# Patient Record
Sex: FEMALE | Race: BLACK OR AFRICAN AMERICAN | NOT HISPANIC OR LATINO | Employment: FULL TIME | URBAN - METROPOLITAN AREA
[De-identification: names, ages, dates, MRNs, and addresses within clinical notes are randomized per-mention and may not be internally consistent; named-entity substitution may affect disease eponyms.]

---

## 2023-04-03 ENCOUNTER — OFFICE VISIT (OUTPATIENT)
Dept: PRIMARY CARE | Facility: CLINIC | Age: 38
End: 2023-04-03
Payer: COMMERCIAL

## 2023-04-03 ENCOUNTER — LAB (OUTPATIENT)
Dept: LAB | Facility: LAB | Age: 38
End: 2023-04-03
Payer: COMMERCIAL

## 2023-04-03 DIAGNOSIS — Z30.42 ENCOUNTER FOR SURVEILLANCE OF INJECTABLE CONTRACEPTIVE: Primary | ICD-10-CM

## 2023-04-03 DIAGNOSIS — Z00.00 ROUTINE GENERAL MEDICAL EXAMINATION AT A HEALTH CARE FACILITY: ICD-10-CM

## 2023-04-03 DIAGNOSIS — B00.52 HSV (HERPES SIMPLEX VIRUS) DENDRITIC KERATITIS: ICD-10-CM

## 2023-04-03 PROBLEM — K58.9 SYMPTOMS CONSISTENT WITH IRRITABLE BOWEL SYNDROME: Status: ACTIVE | Noted: 2023-04-03

## 2023-04-03 PROBLEM — A60.00 GENITAL HERPES SIMPLEX: Status: ACTIVE | Noted: 2023-04-03

## 2023-04-03 PROBLEM — B00.9 HSV (HERPES SIMPLEX VIRUS) INFECTION: Status: ACTIVE | Noted: 2023-04-03

## 2023-04-03 PROBLEM — L21.9 SEBORRHEIC DERMATITIS: Status: ACTIVE | Noted: 2023-04-03

## 2023-04-03 PROBLEM — R09.82 PND (POST-NASAL DRIP): Status: ACTIVE | Noted: 2023-04-03

## 2023-04-03 PROBLEM — J30.9 ALLERGIC RHINITIS: Status: ACTIVE | Noted: 2023-04-03

## 2023-04-03 PROBLEM — K59.00 CONSTIPATION: Status: ACTIVE | Noted: 2023-04-03

## 2023-04-03 LAB
BASOPHILS (10*3/UL) IN BLOOD BY AUTOMATED COUNT: 0.06 X10E9/L (ref 0–0.1)
BASOPHILS/100 LEUKOCYTES IN BLOOD BY AUTOMATED COUNT: 0.5 % (ref 0–2)
EOSINOPHILS (10*3/UL) IN BLOOD BY AUTOMATED COUNT: 0.13 X10E9/L (ref 0–0.7)
EOSINOPHILS/100 LEUKOCYTES IN BLOOD BY AUTOMATED COUNT: 1.2 % (ref 0–6)
ERYTHROCYTE DISTRIBUTION WIDTH (RATIO) BY AUTOMATED COUNT: 12.6 % (ref 11.5–14.5)
ERYTHROCYTE MEAN CORPUSCULAR HEMOGLOBIN CONCENTRATION (G/DL) BY AUTOMATED: 33.2 G/DL (ref 32–36)
ERYTHROCYTE MEAN CORPUSCULAR VOLUME (FL) BY AUTOMATED COUNT: 96 FL (ref 80–100)
ERYTHROCYTES (10*6/UL) IN BLOOD BY AUTOMATED COUNT: 4.12 X10E12/L (ref 4–5.2)
HEMATOCRIT (%) IN BLOOD BY AUTOMATED COUNT: 39.4 % (ref 36–46)
HEMOGLOBIN (G/DL) IN BLOOD: 13.1 G/DL (ref 12–16)
IMMATURE GRANULOCYTES/100 LEUKOCYTES IN BLOOD BY AUTOMATED COUNT: 0.2 % (ref 0–0.9)
LEUKOCYTES (10*3/UL) IN BLOOD BY AUTOMATED COUNT: 11 X10E9/L (ref 4.4–11.3)
LYMPHOCYTES (10*3/UL) IN BLOOD BY AUTOMATED COUNT: 3.75 X10E9/L (ref 1.2–4.8)
LYMPHOCYTES/100 LEUKOCYTES IN BLOOD BY AUTOMATED COUNT: 34.1 % (ref 13–44)
MONOCYTES (10*3/UL) IN BLOOD BY AUTOMATED COUNT: 0.81 X10E9/L (ref 0.1–1)
MONOCYTES/100 LEUKOCYTES IN BLOOD BY AUTOMATED COUNT: 7.4 % (ref 2–10)
NEUTROPHILS (10*3/UL) IN BLOOD BY AUTOMATED COUNT: 6.23 X10E9/L (ref 1.2–7.7)
NEUTROPHILS/100 LEUKOCYTES IN BLOOD BY AUTOMATED COUNT: 56.6 % (ref 40–80)
NRBC (PER 100 WBCS) BY AUTOMATED COUNT: 0 /100 WBC (ref 0–0)
PLATELETS (10*3/UL) IN BLOOD AUTOMATED COUNT: 281 X10E9/L (ref 150–450)

## 2023-04-03 PROCEDURE — 80053 COMPREHEN METABOLIC PANEL: CPT

## 2023-04-03 PROCEDURE — 85025 COMPLETE CBC W/AUTO DIFF WBC: CPT

## 2023-04-03 PROCEDURE — 80061 LIPID PANEL: CPT

## 2023-04-03 PROCEDURE — 36415 COLL VENOUS BLD VENIPUNCTURE: CPT

## 2023-04-03 PROCEDURE — 99395 PREV VISIT EST AGE 18-39: CPT | Performed by: INTERNAL MEDICINE

## 2023-04-03 PROCEDURE — 87491 CHLMYD TRACH DNA AMP PROBE: CPT

## 2023-04-03 PROCEDURE — 87591 N.GONORRHOEAE DNA AMP PROB: CPT

## 2023-04-03 PROCEDURE — 87389 HIV-1 AG W/HIV-1&-2 AB AG IA: CPT

## 2023-04-03 RX ORDER — VALACYCLOVIR HYDROCHLORIDE 500 MG/1
TABLET, FILM COATED ORAL
Qty: 90 TABLET | Refills: 2 | Status: SHIPPED | OUTPATIENT
Start: 2023-04-03 | End: 2023-09-19 | Stop reason: SDUPTHER

## 2023-04-03 RX ORDER — FLUTICASONE PROPIONATE 50 MCG
1 SPRAY, SUSPENSION (ML) NASAL 2 TIMES DAILY
COMMUNITY
Start: 2019-12-26

## 2023-04-03 RX ORDER — MEDROXYPROGESTERONE ACETATE 150 MG/ML
1 INJECTION, SUSPENSION INTRAMUSCULAR
COMMUNITY
Start: 2021-03-18 | End: 2023-04-03 | Stop reason: SDUPTHER

## 2023-04-03 RX ORDER — MEDROXYPROGESTERONE ACETATE 150 MG/ML
150 INJECTION, SUSPENSION INTRAMUSCULAR
Qty: 1 ML | Refills: 3 | Status: SHIPPED | OUTPATIENT
Start: 2023-04-03 | End: 2024-04-02

## 2023-04-03 RX ORDER — CICLOPIROX 1 G/100ML
SHAMPOO TOPICAL
COMMUNITY
Start: 2021-08-09 | End: 2023-09-19 | Stop reason: SDUPTHER

## 2023-04-03 NOTE — PROGRESS NOTES
Patient received a Depo injection today, she provided her own medication brought in from pharmacy. Medroxyprogesterone 150mg/ml, 1ml was injected into right gluteal muscle . NDC: 70090-388-16, LOT: [id number], EXP: 04/30/2027

## 2023-04-03 NOTE — PATIENT INSTRUCTIONS
Cate,     We are doing full lab and urine testing today to make sure you are healthy.   Your Pap was normal in 2020 - this is not due again until 2025.   If you want to try to prevent outbreaks, take one of the Valtrex (valacyclovir) tabs daily every day as a preventive. If you decided not to take it daily, the way to take it when you had a flare would be  TWO tabs twice a day until the flare was over.     CL

## 2023-04-03 NOTE — PROGRESS NOTES
HPI/Subjective: Patient presents for Depo shot. She denies any new complaints. Menstrual periods have ceased with this method of contraception. Denies any new cardiac, pulmonary, GI or  complaints.    A 12-point ROS completed and negative except for that listed in HPI    Physical Exam   Constitutional: appears stated age, NAD  HEENT: normocephalic, atraumatic  Cardiovascular: normal S1 & S2, no murmurs rubs or gallops  Pulmonary: clear to auscultation bilaterally, no wheezes or rales  Abdominal: BS+, non tender, non distended  Extremities: no LE edema      A/P: Ms. Valencia is a 37 year old female who presents for Depo shot for contraception. She has no other active complaints today     #Contraception   -Depo for suppression of menses given SHV associated with menses   -no issues or changes to be made     #Genital HSV   -monthly outbreaks   -Valtrex prophy offered and elected    #HM   -Pap 6/2020 WNL - 5 years   -basic labs today        Faby Anderson MD

## 2023-04-04 LAB
ALANINE AMINOTRANSFERASE (SGPT) (U/L) IN SER/PLAS: 6 U/L (ref 7–45)
ALBUMIN (G/DL) IN SER/PLAS: 4.4 G/DL (ref 3.4–5)
ALKALINE PHOSPHATASE (U/L) IN SER/PLAS: 77 U/L (ref 33–110)
ANION GAP IN SER/PLAS: 15 MMOL/L (ref 10–20)
ASPARTATE AMINOTRANSFERASE (SGOT) (U/L) IN SER/PLAS: 14 U/L (ref 9–39)
BILIRUBIN TOTAL (MG/DL) IN SER/PLAS: 0.4 MG/DL (ref 0–1.2)
CALCIUM (MG/DL) IN SER/PLAS: 9.7 MG/DL (ref 8.6–10.6)
CARBON DIOXIDE, TOTAL (MMOL/L) IN SER/PLAS: 23 MMOL/L (ref 21–32)
CHLAMYDIA TRACH., AMPLIFIED: NEGATIVE
CHLORIDE (MMOL/L) IN SER/PLAS: 105 MMOL/L (ref 98–107)
CHOLESTEROL (MG/DL) IN SER/PLAS: 180 MG/DL (ref 0–199)
CHOLESTEROL IN HDL (MG/DL) IN SER/PLAS: 73.3 MG/DL
CHOLESTEROL/HDL RATIO: 2.5
CREATININE (MG/DL) IN SER/PLAS: 0.63 MG/DL (ref 0.5–1.05)
GFR FEMALE: >90 ML/MIN/1.73M2
GLUCOSE (MG/DL) IN SER/PLAS: 76 MG/DL (ref 74–99)
HIV 1/ 2 AG/AB SCREEN: NONREACTIVE
LDL: 93 MG/DL (ref 0–99)
N. GONORRHEA, AMPLIFIED: NEGATIVE
POTASSIUM (MMOL/L) IN SER/PLAS: 4.2 MMOL/L (ref 3.5–5.3)
PROTEIN TOTAL: 7.4 G/DL (ref 6.4–8.2)
SODIUM (MMOL/L) IN SER/PLAS: 139 MMOL/L (ref 136–145)
TRIGLYCERIDE (MG/DL) IN SER/PLAS: 68 MG/DL (ref 0–149)
UREA NITROGEN (MG/DL) IN SER/PLAS: 10 MG/DL (ref 6–23)
VLDL: 14 MG/DL (ref 0–40)

## 2023-07-03 ENCOUNTER — CLINICAL SUPPORT (OUTPATIENT)
Dept: PRIMARY CARE | Facility: CLINIC | Age: 38
End: 2023-07-03
Payer: COMMERCIAL

## 2023-07-03 DIAGNOSIS — Z30.42 ENCOUNTER FOR SURVEILLANCE OF INJECTABLE CONTRACEPTIVE: ICD-10-CM

## 2023-07-03 NOTE — PROGRESS NOTES
Patient was seen today for depo injection, she brought in her medication from her pharmacy to be administered.   Medroxyprogesterone 150mg/ml  - 1ml was injected into right gluteal muscle. LOT: [id number], NDC: 70576-771-29, EXP: 04/30/2027

## 2023-09-19 ENCOUNTER — OFFICE VISIT (OUTPATIENT)
Dept: PRIMARY CARE | Facility: CLINIC | Age: 38
End: 2023-09-19
Payer: COMMERCIAL

## 2023-09-19 DIAGNOSIS — B00.52 HSV (HERPES SIMPLEX VIRUS) DENDRITIC KERATITIS: ICD-10-CM

## 2023-09-19 DIAGNOSIS — Z23 ENCOUNTER FOR VACCINATION: Primary | ICD-10-CM

## 2023-09-19 DIAGNOSIS — Z30.8 ENCOUNTER FOR OTHER CONTRACEPTIVE MANAGEMENT: ICD-10-CM

## 2023-09-19 DIAGNOSIS — L21.9 SEBORRHEIC DERMATITIS: ICD-10-CM

## 2023-09-19 PROCEDURE — 90471 IMMUNIZATION ADMIN: CPT | Performed by: INTERNAL MEDICINE

## 2023-09-19 PROCEDURE — 99213 OFFICE O/P EST LOW 20 MIN: CPT | Performed by: INTERNAL MEDICINE

## 2023-09-19 PROCEDURE — 96372 THER/PROPH/DIAG INJ SC/IM: CPT | Performed by: INTERNAL MEDICINE

## 2023-09-19 PROCEDURE — 90686 IIV4 VACC NO PRSV 0.5 ML IM: CPT | Performed by: INTERNAL MEDICINE

## 2023-09-19 RX ORDER — MEDROXYPROGESTERONE ACETATE 150 MG/ML
150 INJECTION, SUSPENSION INTRAMUSCULAR ONCE
Status: COMPLETED | OUTPATIENT
Start: 2023-09-19 | End: 2023-09-19

## 2023-09-19 RX ORDER — CICLOPIROX 1 G/100ML
SHAMPOO TOPICAL
Qty: 120 ML | Refills: 11 | Status: SHIPPED | OUTPATIENT
Start: 2023-09-19 | End: 2024-03-21 | Stop reason: SDUPTHER

## 2023-09-19 RX ORDER — VALACYCLOVIR HYDROCHLORIDE 500 MG/1
TABLET, FILM COATED ORAL
Qty: 90 TABLET | Refills: 2 | Status: SHIPPED | OUTPATIENT
Start: 2023-09-19

## 2023-09-19 RX ADMIN — MEDROXYPROGESTERONE ACETATE 150 MG: 150 INJECTION, SUSPENSION INTRAMUSCULAR at 15:56

## 2023-09-19 NOTE — PROGRESS NOTES
"Cate Krause is a 37 yo F presenitng in .     1. Contraception - Depo, which is going well however int in OCP instead   -start OCP in 3 months   -call back in 12.2023 - will call for OCP rx closer to     2. Genital HSV - new start daily Valtrex 4.23 but did not start.     3. Seborrhie cdermatitis - ciclopirox shampoo PRN     #HM   -pap 6.2020 - due 6.2025   -flu shot today   -discuss tdap next visit   -screen labs incl sti 4.23      63\"   163 in 5.22           Gen alert well appearing NAD   HENT mmm pharynx clear   CV rrr nl s/12 no m/r/g        "

## 2023-09-19 NOTE — PROGRESS NOTES
Patient Received Medroxyprogesterone 150 mg/ml today, She provided her own medication she brought from pharmacy. 1 ml was injected into right gluteal muscle.

## 2023-12-18 ENCOUNTER — CLINICAL SUPPORT (OUTPATIENT)
Dept: PRIMARY CARE | Facility: CLINIC | Age: 38
End: 2023-12-18
Payer: COMMERCIAL

## 2023-12-18 DIAGNOSIS — Z30.42 ENCOUNTER FOR SURVEILLANCE OF INJECTABLE CONTRACEPTIVE: ICD-10-CM

## 2023-12-18 PROCEDURE — 96372 THER/PROPH/DIAG INJ SC/IM: CPT | Performed by: INTERNAL MEDICINE

## 2023-12-18 RX ORDER — MEDROXYPROGESTERONE ACETATE 150 MG/ML
150 INJECTION, SUSPENSION INTRAMUSCULAR ONCE
Status: COMPLETED | OUTPATIENT
Start: 2023-12-18 | End: 2023-12-18

## 2023-12-18 RX ADMIN — MEDROXYPROGESTERONE ACETATE 150 MG: 150 INJECTION, SUSPENSION INTRAMUSCULAR at 15:17

## 2024-03-04 ENCOUNTER — TELEPHONE (OUTPATIENT)
Dept: PRIMARY CARE | Facility: CLINIC | Age: 39
End: 2024-03-04
Payer: COMMERCIAL

## 2024-03-04 NOTE — TELEPHONE ENCOUNTER
Patient is on depo, she discussed switching to Slynd birth control instead with Dr Schneider, she is due for next depo coming up and she just want to start Slynd at that time, can you send in a rx for Slynd   to rena listed in chart

## 2024-03-13 ENCOUNTER — TELEPHONE (OUTPATIENT)
Dept: PRIMARY CARE | Facility: CLINIC | Age: 39
End: 2024-03-13
Payer: COMMERCIAL

## 2024-03-13 NOTE — TELEPHONE ENCOUNTER
Patient said she spoke with you about changing her contraceptive  , she do not want the depo any more, she is requesting  slynd  be sent to her local pharmacy

## 2024-03-14 DIAGNOSIS — Z30.09 ENCOUNTER FOR COUNSELING REGARDING CONTRACEPTION: Primary | ICD-10-CM

## 2024-03-14 RX ORDER — DROSPIRENONE AND ETHINYL ESTRADIOL 0.03MG-3MG
1 KIT ORAL DAILY
Qty: 28 TABLET | Refills: 12 | Status: SHIPPED | OUTPATIENT
Start: 2024-03-14 | End: 2025-03-14

## 2024-03-15 ENCOUNTER — CLINICAL SUPPORT (OUTPATIENT)
Dept: OBSTETRICS AND GYNECOLOGY | Facility: CLINIC | Age: 39
End: 2024-03-15
Payer: COMMERCIAL

## 2024-03-15 DIAGNOSIS — A64 STD (FEMALE): ICD-10-CM

## 2024-03-15 PROCEDURE — 87205 SMEAR GRAM STAIN: CPT

## 2024-03-15 PROCEDURE — 87661 TRICHOMONAS VAGINALIS AMPLIF: CPT

## 2024-03-15 PROCEDURE — 87591 N.GONORRHOEAE DNA AMP PROB: CPT

## 2024-03-15 PROCEDURE — 87491 CHLMYD TRACH DNA AMP PROBE: CPT

## 2024-03-15 NOTE — PROGRESS NOTES
Name/ verified  Pt arrives for STI testing  C/O vaginal green discharge intermittently, no odor. She states that she noticed it about a month ago. She states that her partner told her yesterday that he recently tested positive for Trich. Pt visibly upset. Emotional support provided.  Vag swab and all STI testing requested by patient.  Will inform pt with results.

## 2024-03-16 LAB
C TRACH RRNA SPEC QL NAA+PROBE: NEGATIVE
CLUE CELLS VAG LPF-#/AREA: NORMAL /[LPF]
N GONORRHOEA DNA SPEC QL PROBE+SIG AMP: NEGATIVE
NUGENT SCORE: 1
T VAGINALIS RRNA SPEC QL NAA+PROBE: POSITIVE
YEAST VAG WET PREP-#/AREA: NORMAL

## 2024-03-18 ENCOUNTER — TELEPHONE (OUTPATIENT)
Dept: OBSTETRICS AND GYNECOLOGY | Facility: CLINIC | Age: 39
End: 2024-03-18
Payer: COMMERCIAL

## 2024-03-18 ENCOUNTER — TELEPHONE (OUTPATIENT)
Dept: PRIMARY CARE | Facility: CLINIC | Age: 39
End: 2024-03-18
Payer: COMMERCIAL

## 2024-03-18 DIAGNOSIS — B37.9 YEAST INFECTION: ICD-10-CM

## 2024-03-18 DIAGNOSIS — A64 STD (FEMALE): ICD-10-CM

## 2024-03-18 RX ORDER — FLUCONAZOLE 150 MG/1
150 TABLET ORAL ONCE
Qty: 1 TABLET | Refills: 0 | Status: SHIPPED | OUTPATIENT
Start: 2024-03-18 | End: 2024-03-18

## 2024-03-18 NOTE — TELEPHONE ENCOUNTER
Called and notified Pt that she has been diagnosed with a sexually transmitted infection.    An antibiotic has been ordered for pt.   EPT can be offered for up to two partners within the past 60 days. Pt declined EPT.  Advised to take antibiotics as soon as possible and encourage partner to do the same.   Please refrain from sex for at least 7 days after both her and partner are treated.   Please use condoms to prevent STIs.    Pt request Diflucan d/t HX of yeast infections when taking antibiotic. Request sent to Dr. Betancur.  Advised to use Monistat 3 night cream OTC if symptoms occur.  Pt verbalized understanding.

## 2024-03-18 NOTE — TELEPHONE ENCOUNTER
Patient said she need refills on her shampoo, also she had a hydrocortisone oil for her scalp that the dermatologist prescribed , she want to know if that can be refilled  and she is taking a antibiotic and need a rx for yeast , please send al 3 to [facility] if posiible

## 2024-03-19 ENCOUNTER — TELEPHONE (OUTPATIENT)
Dept: OBSTETRICS AND GYNECOLOGY | Facility: CLINIC | Age: 39
End: 2024-03-19
Payer: COMMERCIAL

## 2024-03-19 RX ORDER — METRONIDAZOLE 500 MG/1
500 TABLET ORAL 2 TIMES DAILY
Qty: 14 TABLET | Refills: 0 | Status: SHIPPED | OUTPATIENT
Start: 2024-03-19 | End: 2024-03-26

## 2024-03-19 NOTE — TELEPHONE ENCOUNTER
Returned pt phone call notifying that the order for Metronidazole was sent to Dr. Betancur to send to pharmacy.

## 2024-03-21 DIAGNOSIS — B37.31 VULVOVAGINAL CANDIDIASIS: Primary | ICD-10-CM

## 2024-03-21 DIAGNOSIS — L21.9 SEBORRHEIC DERMATITIS: ICD-10-CM

## 2024-03-21 RX ORDER — FLUCONAZOLE 150 MG/1
TABLET ORAL
Qty: 1 TABLET | Refills: 0 | Status: SHIPPED | OUTPATIENT
Start: 2024-03-21

## 2024-03-21 RX ORDER — FLUOCINOLONE ACETONIDE 0.11 MG/ML
OIL TOPICAL 3 TIMES DAILY
Qty: 118.28 ML | Refills: 1 | Status: SHIPPED | OUTPATIENT
Start: 2024-03-21 | End: 2025-03-21

## 2024-03-21 RX ORDER — CICLOPIROX 1 G/100ML
SHAMPOO TOPICAL
Qty: 120 ML | Refills: 11 | Status: SHIPPED | OUTPATIENT
Start: 2024-03-21

## 2024-04-01 ENCOUNTER — LAB (OUTPATIENT)
Dept: LAB | Facility: LAB | Age: 39
End: 2024-04-01
Payer: COMMERCIAL

## 2024-04-01 ENCOUNTER — OFFICE VISIT (OUTPATIENT)
Dept: PRIMARY CARE | Facility: CLINIC | Age: 39
End: 2024-04-01
Payer: COMMERCIAL

## 2024-04-01 VITALS
DIASTOLIC BLOOD PRESSURE: 81 MMHG | HEART RATE: 102 BPM | BODY MASS INDEX: 26.93 KG/M2 | SYSTOLIC BLOOD PRESSURE: 123 MMHG | WEIGHT: 152 LBS | HEIGHT: 63 IN | OXYGEN SATURATION: 98 %

## 2024-04-01 DIAGNOSIS — R30.0 DYSURIA: ICD-10-CM

## 2024-04-01 DIAGNOSIS — A64 STD (FEMALE): ICD-10-CM

## 2024-04-01 DIAGNOSIS — R30.0 DYSURIA: Primary | ICD-10-CM

## 2024-04-01 PROCEDURE — 87661 TRICHOMONAS VAGINALIS AMPLIF: CPT

## 2024-04-01 PROCEDURE — 86695 HERPES SIMPLEX TYPE 1 TEST: CPT

## 2024-04-01 PROCEDURE — 86696 HERPES SIMPLEX TYPE 2 TEST: CPT

## 2024-04-01 PROCEDURE — 1036F TOBACCO NON-USER: CPT | Performed by: INTERNAL MEDICINE

## 2024-04-01 PROCEDURE — 87340 HEPATITIS B SURFACE AG IA: CPT

## 2024-04-01 PROCEDURE — 36415 COLL VENOUS BLD VENIPUNCTURE: CPT

## 2024-04-01 PROCEDURE — 99213 OFFICE O/P EST LOW 20 MIN: CPT | Performed by: INTERNAL MEDICINE

## 2024-04-01 PROCEDURE — 87389 HIV-1 AG W/HIV-1&-2 AB AG IA: CPT

## 2024-04-01 NOTE — PROGRESS NOTES
Cate Krause is a 39 yo F presenting for acute visit.     *Tested positive for trichomonas on 3.15.24   -treated and partner treated as well   -when partner ejaculated on her she noted whiter colored discharge, wonders if was fully treated       *Just switched from Depo to first pill pack of OCPs middle of March.   -plans to continue to give it a try       Chronic PL     1. Contraception   -Depo in the past, OCPs more recently       2. Genital HSV   -Valtrex daily     3. Seborrheic dermatitis   -ciclopirox topical shampoo PRN     #HM   -pap 6.2020 - 5 yrs   -screen labs 4.2023         ROS   Gen neg fever neg chills neg malaise       O   Gen alert well appearing NAD       A/P   Cate presents after positive trichomonas 3.15.24 s/p 7 days Flagyl with some GI intolerance.   -re-screen urine ALBERT

## 2024-04-02 LAB
HBV SURFACE AG SERPL QL IA: NONREACTIVE
HERPES SIMPLEX VIRUS 1 IGG: 0.3 INDEX
HERPES SIMPLEX VIRUS 2 IGG: >8 INDEX
HIV 1+2 AB+HIV1 P24 AG SERPL QL IA: NONREACTIVE
T VAGINALIS RRNA SPEC QL NAA+PROBE: NEGATIVE

## 2024-04-03 ENCOUNTER — TELEPHONE (OUTPATIENT)
Dept: OBSTETRICS AND GYNECOLOGY | Facility: CLINIC | Age: 39
End: 2024-04-03
Payer: COMMERCIAL

## 2024-04-12 ENCOUNTER — APPOINTMENT (OUTPATIENT)
Dept: PRIMARY CARE | Facility: CLINIC | Age: 39
End: 2024-04-12
Payer: COMMERCIAL

## 2024-04-24 ENCOUNTER — APPOINTMENT (OUTPATIENT)
Dept: PRIMARY CARE | Facility: CLINIC | Age: 39
End: 2024-04-24
Payer: COMMERCIAL

## 2024-05-22 ENCOUNTER — HOSPITAL ENCOUNTER (OUTPATIENT)
Dept: RADIOLOGY | Facility: EXTERNAL LOCATION | Age: 39
Discharge: HOME | End: 2024-05-22
Payer: COMMERCIAL

## 2024-05-22 DIAGNOSIS — J20.9 ACUTE BRONCHITIS, UNSPECIFIED ORGANISM: ICD-10-CM

## 2024-07-09 ENCOUNTER — LAB (OUTPATIENT)
Dept: LAB | Facility: LAB | Age: 39
End: 2024-07-09
Payer: COMMERCIAL

## 2024-07-09 ENCOUNTER — APPOINTMENT (OUTPATIENT)
Dept: PRIMARY CARE | Facility: CLINIC | Age: 39
End: 2024-07-09
Payer: COMMERCIAL

## 2024-07-09 VITALS
OXYGEN SATURATION: 98 % | DIASTOLIC BLOOD PRESSURE: 81 MMHG | HEART RATE: 75 BPM | BODY MASS INDEX: 25.44 KG/M2 | RESPIRATION RATE: 16 BRPM | TEMPERATURE: 98 F | WEIGHT: 149 LBS | SYSTOLIC BLOOD PRESSURE: 116 MMHG | HEIGHT: 64 IN

## 2024-07-09 DIAGNOSIS — B00.52 HSV (HERPES SIMPLEX VIRUS) DENDRITIC KERATITIS: ICD-10-CM

## 2024-07-09 DIAGNOSIS — Z11.3 SCREEN FOR STD (SEXUALLY TRANSMITTED DISEASE): Primary | ICD-10-CM

## 2024-07-09 DIAGNOSIS — Z11.3 SCREEN FOR STD (SEXUALLY TRANSMITTED DISEASE): ICD-10-CM

## 2024-07-09 DIAGNOSIS — Z00.00 HEALTH CARE MAINTENANCE: ICD-10-CM

## 2024-07-09 DIAGNOSIS — Z30.09 ENCOUNTER FOR COUNSELING REGARDING CONTRACEPTION: ICD-10-CM

## 2024-07-09 DIAGNOSIS — L21.9 SEBORRHEIC DERMATITIS: ICD-10-CM

## 2024-07-09 LAB
ALBUMIN SERPL BCP-MCNC: 4.2 G/DL (ref 3.4–5)
ALP SERPL-CCNC: 60 U/L (ref 33–110)
ALT SERPL W P-5'-P-CCNC: 10 U/L (ref 7–45)
ANION GAP SERPL CALC-SCNC: 14 MMOL/L (ref 10–20)
AST SERPL W P-5'-P-CCNC: 13 U/L (ref 9–39)
BILIRUB SERPL-MCNC: 0.3 MG/DL (ref 0–1.2)
BUN SERPL-MCNC: 10 MG/DL (ref 6–23)
CALCIUM SERPL-MCNC: 9.5 MG/DL (ref 8.6–10.6)
CHLORIDE SERPL-SCNC: 103 MMOL/L (ref 98–107)
CHOLEST SERPL-MCNC: 231 MG/DL (ref 0–199)
CHOLESTEROL/HDL RATIO: 2.1
CO2 SERPL-SCNC: 24 MMOL/L (ref 21–32)
CREAT SERPL-MCNC: 0.66 MG/DL (ref 0.5–1.05)
EGFRCR SERPLBLD CKD-EPI 2021: >90 ML/MIN/1.73M*2
ERYTHROCYTE [DISTWIDTH] IN BLOOD BY AUTOMATED COUNT: 12.1 % (ref 11.5–14.5)
EST. AVERAGE GLUCOSE BLD GHB EST-MCNC: 100 MG/DL
GLUCOSE SERPL-MCNC: 84 MG/DL (ref 74–99)
HBA1C MFR BLD: 5.1 %
HBV SURFACE AB SER-ACNC: <3.1 MIU/ML
HBV SURFACE AG SERPL QL IA: NONREACTIVE
HCT VFR BLD AUTO: 38.9 % (ref 36–46)
HCV AB SER QL: NONREACTIVE
HDLC SERPL-MCNC: 111 MG/DL
HGB BLD-MCNC: 12.5 G/DL (ref 12–16)
HIV 1+2 AB+HIV1 P24 AG SERPL QL IA: NONREACTIVE
LDLC SERPL CALC-MCNC: 96 MG/DL
MCH RBC QN AUTO: 32.1 PG (ref 26–34)
MCHC RBC AUTO-ENTMCNC: 32.1 G/DL (ref 32–36)
MCV RBC AUTO: 100 FL (ref 80–100)
NON HDL CHOLESTEROL: 120 MG/DL (ref 0–149)
NRBC BLD-RTO: 0 /100 WBCS (ref 0–0)
PLATELET # BLD AUTO: 289 X10*3/UL (ref 150–450)
POTASSIUM SERPL-SCNC: 4.1 MMOL/L (ref 3.5–5.3)
PROT SERPL-MCNC: 7.2 G/DL (ref 6.4–8.2)
RBC # BLD AUTO: 3.89 X10*6/UL (ref 4–5.2)
SODIUM SERPL-SCNC: 137 MMOL/L (ref 136–145)
TRIGL SERPL-MCNC: 119 MG/DL (ref 0–149)
TSH SERPL-ACNC: 3.44 MIU/L (ref 0.44–3.98)
VLDL: 24 MG/DL (ref 0–40)
WBC # BLD AUTO: 9.9 X10*3/UL (ref 4.4–11.3)

## 2024-07-09 PROCEDURE — 84443 ASSAY THYROID STIM HORMONE: CPT

## 2024-07-09 PROCEDURE — 3008F BODY MASS INDEX DOCD: CPT | Performed by: STUDENT IN AN ORGANIZED HEALTH CARE EDUCATION/TRAINING PROGRAM

## 2024-07-09 PROCEDURE — 86706 HEP B SURFACE ANTIBODY: CPT

## 2024-07-09 PROCEDURE — 85027 COMPLETE CBC AUTOMATED: CPT

## 2024-07-09 PROCEDURE — 87591 N.GONORRHOEAE DNA AMP PROB: CPT

## 2024-07-09 PROCEDURE — 87389 HIV-1 AG W/HIV-1&-2 AB AG IA: CPT

## 2024-07-09 PROCEDURE — 36415 COLL VENOUS BLD VENIPUNCTURE: CPT

## 2024-07-09 PROCEDURE — 99213 OFFICE O/P EST LOW 20 MIN: CPT | Performed by: STUDENT IN AN ORGANIZED HEALTH CARE EDUCATION/TRAINING PROGRAM

## 2024-07-09 PROCEDURE — 87491 CHLMYD TRACH DNA AMP PROBE: CPT

## 2024-07-09 PROCEDURE — 83036 HEMOGLOBIN GLYCOSYLATED A1C: CPT

## 2024-07-09 PROCEDURE — 87340 HEPATITIS B SURFACE AG IA: CPT

## 2024-07-09 PROCEDURE — 87661 TRICHOMONAS VAGINALIS AMPLIF: CPT

## 2024-07-09 PROCEDURE — 80053 COMPREHEN METABOLIC PANEL: CPT

## 2024-07-09 PROCEDURE — 80061 LIPID PANEL: CPT

## 2024-07-09 PROCEDURE — 86803 HEPATITIS C AB TEST: CPT

## 2024-07-09 RX ORDER — DROSPIRENONE AND ETHINYL ESTRADIOL 0.03MG-3MG
KIT ORAL
Qty: 28 TABLET | Refills: 12 | Status: SHIPPED | OUTPATIENT
Start: 2024-07-09

## 2024-07-09 RX ORDER — CICLOPIROX 1 G/100ML
SHAMPOO TOPICAL
Qty: 120 ML | Refills: 11 | Status: SHIPPED | OUTPATIENT
Start: 2024-07-09

## 2024-07-09 RX ORDER — VALACYCLOVIR HYDROCHLORIDE 500 MG/1
TABLET, FILM COATED ORAL
Qty: 90 TABLET | Refills: 5 | Status: SHIPPED | OUTPATIENT
Start: 2024-07-09

## 2024-07-09 NOTE — PROGRESS NOTES
"Subjective   Patient ID: Cate Krause is a 39 y.o. female who presents for Establish Care.  HPI  Ms. Krause is 39 years old here for establishing care  Former patient of Dr. Schneider    Medical problems include  1.  Genital HSV-on Valtrex daily  2.  On birth control pills  No past medical history on file.   Past Surgical History:   Procedure Laterality Date    CERVICAL BIOPSY  W/ LOOP ELECTRODE EXCISION  05/29/2014    Cervical Loop Electrosurgical Excision (LEEP)      Family History   Problem Relation Name Age of Onset    Diabetes Other grandmother       No Known Allergies       Occupation:     Review of Systems   Constitutional:  Negative for activity change and fever.   HENT:  Negative for congestion.    Respiratory:  Negative for cough, shortness of breath and wheezing.    Cardiovascular:  Negative for chest pain and leg swelling.   Gastrointestinal:  Negative for abdominal pain, constipation, nausea and vomiting.   Endocrine: Negative for cold intolerance.   Genitourinary:  Negative for dysuria, hematuria and urgency.   Neurological:  Negative for dizziness, speech difficulty, weakness and numbness.   Psychiatric/Behavioral:  Negative for self-injury and suicidal ideas.        Objective   Visit Vitals  /81   Pulse 75   Temp 36.7 °C (98 °F)   Resp 16   Ht 1.626 m (5' 4\")   Wt 67.6 kg (149 lb)   SpO2 98%   BMI 25.58 kg/m²   Smoking Status Never   BSA 1.75 m²      Physical Exam  Constitutional:       Appearance: Normal appearance.   HENT:      Head: Normocephalic and atraumatic.      Nose: Nose normal.      Mouth/Throat:      Mouth: Mucous membranes are moist.   Eyes:      Conjunctiva/sclera: Conjunctivae normal.      Pupils: Pupils are equal, round, and reactive to light.   Cardiovascular:      Rate and Rhythm: Normal rate and regular rhythm.      Pulses: Normal pulses.      Heart sounds: Normal heart sounds.   Pulmonary:      Effort: Pulmonary effort is normal.      Breath sounds: Normal breath sounds. "   Musculoskeletal:         General: Normal range of motion.      Cervical back: Neck supple.   Skin:     General: Skin is warm.   Neurological:      General: No focal deficit present.      Mental Status: She is alert and oriented to person, place, and time.   Psychiatric:         Mood and Affect: Mood normal.         Behavior: Behavior normal.         Thought Content: Thought content normal.         Judgment: Judgment normal.         Assessment/Plan   Diagnoses and all orders for this visit:  Screen for STD (sexually transmitted disease)  -     Hemoglobin A1C; Future  -     Lipid Panel; Future  -     Comprehensive Metabolic Panel; Future  -     CBC; Future  -     TSH with reflex to Free T4 if abnormal; Future  -     C. Trachomatis / N. Gonorrhoeae, Amplified Detection; Future  -     Trichomonas vaginalis, Nucleic Acid Detection; Future  -     HIV 1/2 Antigen/Antibody Screen with Reflex to Confirmation; Future  -     Hepatitis B Surface Antibody; Future  -     Hepatitis B Surface Antigen; Future  -     Hepatitis C Antibody; Future  Seborrheic dermatitis  -     ciclopirox 1 % shampoo; MASSAGE ONTO WET SCALP AND LEAVE LATHER ON FOR 3 MINUTES, THEN RINSE. USE TWICE WEEKLY FOR 4 WEEKS.  Encounter for counseling regarding contraception  -     drospirenone-ethinyl estradioL (Vanita, 28,) 3-0.03 mg tablet; 1 tablet once daily in the order presented in the blister pack, omitting placebo tablets, and continuing with a new pack of active tablets  HSV (herpes simplex virus) dendritic keratitis  -     valACYclovir (Valtrex) 500 mg tablet; Take 1 tab PO daily  Health care maintenance  -     Hemoglobin A1C; Future  -     Lipid Panel; Future  -     Comprehensive Metabolic Panel; Future  -     CBC; Future  -     TSH with reflex to Free T4 if abnormal; Future  -     C. Trachomatis / N. Gonorrhoeae, Amplified Detection; Future  -     Trichomonas vaginalis, Nucleic Acid Detection; Future  -     HIV 1/2 Antigen/Antibody Screen with  Reflex to Confirmation; Future  -     Hepatitis B Surface Antibody; Future  -     Hepatitis B Surface Antigen; Future  -     Hepatitis C Antibody; Future

## 2024-07-10 LAB
C TRACH RRNA SPEC QL NAA+PROBE: NEGATIVE
N GONORRHOEA DNA SPEC QL PROBE+SIG AMP: NEGATIVE
T VAGINALIS RRNA SPEC QL NAA+PROBE: NEGATIVE

## 2024-07-31 ASSESSMENT — ENCOUNTER SYMPTOMS
ABDOMINAL PAIN: 0
ACTIVITY CHANGE: 0
SHORTNESS OF BREATH: 0
NAUSEA: 0
DIZZINESS: 0
WEAKNESS: 0
FEVER: 0
COUGH: 0
CONSTIPATION: 0
HEMATURIA: 0
VOMITING: 0
NUMBNESS: 0
SPEECH DIFFICULTY: 0
WHEEZING: 0
DYSURIA: 0

## 2024-09-26 ENCOUNTER — TELEPHONE (OUTPATIENT)
Dept: PRIMARY CARE | Facility: CLINIC | Age: 39
End: 2024-09-26

## 2024-09-26 NOTE — TELEPHONE ENCOUNTER
----- Message from Abena Maynard sent at 9/11/2024  5:50 PM EDT -----  please call the patient and let him/her know her labs  1) Elevated cholesterol: to follow a low carb diet, avoid fatty foods, Exercise at least for 150 min/week. No medication needed at this time but will continue to monitor and repeat levels in 3-6 months      Patient can call the office with any questions  to see me in 3months for a follow up and repeat labs

## 2024-10-28 ENCOUNTER — APPOINTMENT (OUTPATIENT)
Dept: PRIMARY CARE | Facility: CLINIC | Age: 39
End: 2024-10-28
Payer: COMMERCIAL

## 2025-01-29 DIAGNOSIS — L21.9 SEBORRHEIC DERMATITIS: ICD-10-CM

## 2025-01-30 ENCOUNTER — APPOINTMENT (OUTPATIENT)
Dept: PRIMARY CARE | Facility: CLINIC | Age: 40
End: 2025-01-30
Payer: COMMERCIAL

## 2025-01-30 RX ORDER — CICLOPIROX 1 G/100ML
SHAMPOO TOPICAL
Qty: 120 ML | Refills: 11 | Status: SHIPPED | OUTPATIENT
Start: 2025-01-30

## 2025-02-25 ENCOUNTER — TELEPHONE (OUTPATIENT)
Dept: PRIMARY CARE | Facility: CLINIC | Age: 40
End: 2025-02-25
Payer: COMMERCIAL

## 2025-02-25 DIAGNOSIS — Z30.09 ENCOUNTER FOR COUNSELING REGARDING CONTRACEPTION: ICD-10-CM

## 2025-02-26 RX ORDER — DROSPIRENONE AND ETHINYL ESTRADIOL 0.03MG-3MG
KIT ORAL
Qty: 28 TABLET | Refills: 12 | Status: SHIPPED | OUTPATIENT
Start: 2025-02-26

## 2025-05-06 ENCOUNTER — TELEPHONE (OUTPATIENT)
Dept: OBSTETRICS AND GYNECOLOGY | Facility: CLINIC | Age: 40
End: 2025-05-06
Payer: COMMERCIAL

## 2025-05-06 NOTE — TELEPHONE ENCOUNTER
Patient has lump under left breast. Annual is scheduled 6/17, currently next available is 6/2 with Dr. Hill. Is it ok to wait that long or is there something she should do sooner?

## 2025-05-07 ENCOUNTER — OFFICE VISIT (OUTPATIENT)
Dept: OBSTETRICS AND GYNECOLOGY | Facility: CLINIC | Age: 40
End: 2025-05-07
Payer: COMMERCIAL

## 2025-05-07 VITALS
WEIGHT: 145 LBS | BODY MASS INDEX: 25.69 KG/M2 | DIASTOLIC BLOOD PRESSURE: 70 MMHG | HEIGHT: 63 IN | SYSTOLIC BLOOD PRESSURE: 120 MMHG

## 2025-05-07 DIAGNOSIS — N63.0 LUMP IN FEMALE BREAST: ICD-10-CM

## 2025-05-07 PROCEDURE — 3008F BODY MASS INDEX DOCD: CPT | Performed by: OBSTETRICS & GYNECOLOGY

## 2025-05-07 PROCEDURE — 1036F TOBACCO NON-USER: CPT | Performed by: OBSTETRICS & GYNECOLOGY

## 2025-05-07 PROCEDURE — 99214 OFFICE O/P EST MOD 30 MIN: CPT | Performed by: OBSTETRICS & GYNECOLOGY

## 2025-05-07 ASSESSMENT — ENCOUNTER SYMPTOMS
RESPIRATORY NEGATIVE: 0
NEUROLOGICAL NEGATIVE: 0
HEMATOLOGIC/LYMPHATIC NEGATIVE: 0
GASTROINTESTINAL NEGATIVE: 0
CONSTITUTIONAL NEGATIVE: 0
EYES NEGATIVE: 0
PSYCHIATRIC NEGATIVE: 0
MUSCULOSKELETAL NEGATIVE: 0
ENDOCRINE NEGATIVE: 0
ALLERGIC/IMMUNOLOGIC NEGATIVE: 0
CARDIOVASCULAR NEGATIVE: 0

## 2025-05-07 ASSESSMENT — PAIN SCALES - GENERAL: PAINLEVEL_OUTOF10: 0-NO PAIN

## 2025-05-07 NOTE — PATIENT INSTRUCTIONS
Thanks for coming in today for follow-up.    Arrange to have a mammogram performed to help evaluate your breast masses.    Follow-up with the breast center after your mammogram.    Return to the office for your annual GYN exam or as needed.    Follow-up with your PCP and other healthcare specialist as needed.    Feel free to call the office with any problems, questions or concerns prior to your next scheduled visit.

## 2025-05-08 ENCOUNTER — HOSPITAL ENCOUNTER (OUTPATIENT)
Dept: RADIOLOGY | Facility: CLINIC | Age: 40
Discharge: HOME | End: 2025-05-08
Payer: COMMERCIAL

## 2025-05-08 DIAGNOSIS — N63.0 LUMP IN FEMALE BREAST: ICD-10-CM

## 2025-05-08 PROCEDURE — 76983 USE EA ADDL TARGET LESION: CPT

## 2025-05-08 PROCEDURE — 76642 ULTRASOUND BREAST LIMITED: CPT

## 2025-05-08 PROCEDURE — 77062 BREAST TOMOSYNTHESIS BI: CPT

## 2025-05-08 NOTE — PROGRESS NOTES
Assessment and Plan:  Cate Krause is a 39 y/o woman presenting today with a left breast mass.     Diagnoses:  #1 Lump in breast    Assessment/Plan:  1. Bilateral firm breast nodules, questionable clogged ducts/lymph node  - Diagnostic mammogram ordered.  - Follow up with the breast center.  - Return to the office for annual GYN exam or PRN.    Follow up with Dr. Hill.    Kim Attestation  By signing my name below, I, Kim Hdz, attest that this documentation has been prepared under the direction and in the presence of Scarlett Hill MD on 5/7/2025 at 8:10 PM.     HPI:   Cate Krause is a 39 y/o woman presenting today with a left breast mass. She reports a lump in her left breast that she noticed a few weeks ago. She denies nipple discharge, drainage, or tenderness.    Past medical hx: Negative.    Family medical hx: Maternal great grandmother had ovarian cancer.    Surgical hx: Tear duct surgery as a child.    Social hx: No smoking, vaping, alcohol use, or drug use.     GYN hx: M12y/o. No menses on continuous OCP. Hx of TV, CT, and HSV. Hx of an abnormal pap with biopsy. Not currently SA. No hx of sexual abuse or rape.    OB hx: G0.    ROS:  Review of Systems    Physical Exam:   Physical Exam  Constitutional:       General: She is not in acute distress.     Appearance: Normal appearance. She is normal weight.   Genitourinary:   Breasts:     Right: Mass present. No inverted nipple or nipple discharge.      Left: Mass present. No inverted nipple or nipple discharge.      Breast exam comments: In the tail of the right breast there is a 6 mm non-tender, mobile, firm nodule. In the left breast at the 10 o'clock position there is an 8 mm non-tender, mobile, firm nodule. No other masses are appreciated..  Chest:       Lymphadenopathy:      Upper Body:      Right upper body: No axillary adenopathy.      Left upper body: No axillary adenopathy.   Neurological:      Mental Status: She is alert and oriented  to person, place, and time.      Comments: Pleasant and cooperative

## 2025-05-12 ENCOUNTER — TELEPHONE (OUTPATIENT)
Dept: OBSTETRICS AND GYNECOLOGY | Facility: CLINIC | Age: 40
End: 2025-05-12
Payer: COMMERCIAL

## 2025-05-12 NOTE — TELEPHONE ENCOUNTER
Pt verified by name and .  Pt is aware of Dr. Hill's message:  follow up in the [facility]. Also needs a 6 month right breast F/U US and  2-3 month left breast F/U US  Orders in computer.  Dense breast noted she, should talk with the Breast Center about a $250 Fast MRI.   Pt has appt with breast center Thursday.  Pt is aware nurse will mail her information on fast mri's.  Pt has no questions at this time.

## 2025-05-14 ENCOUNTER — DOCUMENTATION (OUTPATIENT)
Dept: OBSTETRICS AND GYNECOLOGY | Facility: CLINIC | Age: 40
End: 2025-05-14
Payer: COMMERCIAL

## 2025-05-15 ENCOUNTER — APPOINTMENT (OUTPATIENT)
Dept: SURGICAL ONCOLOGY | Facility: HOSPITAL | Age: 40
End: 2025-05-15
Payer: COMMERCIAL

## 2025-05-15 ENCOUNTER — PROCEDURE VISIT (OUTPATIENT)
Dept: SURGICAL ONCOLOGY | Facility: HOSPITAL | Age: 40
End: 2025-05-15
Payer: COMMERCIAL

## 2025-05-15 DIAGNOSIS — R92.8 ABNORMAL FINDING ON BREAST IMAGING: Primary | ICD-10-CM

## 2025-05-15 PROCEDURE — 99213 OFFICE O/P EST LOW 20 MIN: CPT

## 2025-05-15 PROCEDURE — 99203 OFFICE O/P NEW LOW 30 MIN: CPT

## 2025-05-15 NOTE — PROGRESS NOTES
[facility]  Cate Krause female   1985 40 y.o.  [id number]      Chief Complaint  New patient, biopsy consultation.    History Of Present Illness  Cate Krause is a very pleasant 40 y.o. female  referred to the Breast Clinic by Dr. Hill for biopsy consultation. She denies breast surgery or biopsy. She has no family history of breast cancer.      BREAST IMAGIN2025 bilateral diagnostic mammogram BI-RADS Category 4. Right breast 1:00 8 cm from the nipple 0.9 x 0.5 x 0.8 cm probably benign mass warranting 6 month follow up. Left breast 11:00 4 cm from the nipple indeterminate mass warranting ultrasound guided biopsy.     REPRODUCTIVE HISTORY: menarche age 12, nulliparous, OCP's > 20 years, premenopausal, heterogeneously dense     FAMILY CANCER HISTORY:   Paternal great grandmother: Ovarian cancer     Review of Systems  Constitutional:  Negative for appetite change, fatigue, fever and unexpected weight change.   HENT:  Negative for ear pain, hearing loss, nosebleeds, sore throat and trouble swallowing.    Eyes:  Negative for discharge, itching and visual disturbance.   Breast: As stated in HPI.  Respiratory:  Negative for cough, chest tightness and shortness of breath.    Cardiovascular:  Negative for chest pain, palpitations and leg swelling.   Gastrointestinal:  Negative for abdominal pain, constipation, diarrhea and nausea.   Endocrine: Negative for cold intolerance and heat intolerance.   Genitourinary:  Negative for dysuria, frequency, hematuria, pelvic pain and vaginal bleeding.   Musculoskeletal:  Negative for arthralgias, back pain, gait problem, joint swelling and myalgias.   Skin:  Negative for color change and rash.   Allergic/Immunologic: Negative for environmental allergies and food allergies.   Neurological:  Negative for dizziness, tremors, speech difficulty, weakness, numbness and headaches.   Hematological:  Does not bruise/bleed easily.   Psychiatric/Behavioral:  Negative for  agitation, dysphoric mood and sleep disturbance. The patient is not nervous/anxious.       Past Medical History    Medical History[1]  Patient Active Problem List    Diagnosis Date Noted    Allergic rhinitis 04/03/2023    Constipation 04/03/2023    Genital herpes simplex 04/03/2023    HSV (herpes simplex virus) infection 04/03/2023    PND (post-nasal drip) 04/03/2023    Seborrheic dermatitis 04/03/2023    Symptoms consistent with irritable bowel syndrome 04/03/2023         Surgical History  She has a past surgical history that includes Cervical biopsy w/ loop electrode excision (05/29/2014) and Tear duct surgery (N/A).    Family History  Cancer-related family history includes Ovarian cancer in her paternal great-grandmother.     Social History  She reports that she has never smoked. She has never been exposed to tobacco smoke. She has never used smokeless tobacco. She reports that she does not drink alcohol and does not use drugs.    Allergies  Patient has no known allergies.    Medications  Current Outpatient Medications   Medication Instructions    ciclopirox 1 % shampoo MASSAGE ONTO WET SCALP AND LEAVE ON FOR 3 MINUTES THEN RINSE. USE TWICE WEEKLY FOR 4 WEEKS    drospirenone-ethinyl estradioL (Vanita, 28,) 3-0.03 mg tablet 1 tablet once daily in the order presented in the blister pack, omitting placebo tablets, and continuing with a new pack of active tablets    fluconazole (Diflucan) 150 mg tablet Take 1 tab PO daily for 3 days    fluticasone (Flonase) 50 mcg/actuation nasal spray 1 spray, Each Nostril, 2 times daily    medroxyPROGESTERone (DEPO-PROVERA) 150 mg, intramuscular, Every 3 months    valACYclovir (Valtrex) 500 mg tablet Take 1 tab PO daily         Physical Exam  Physical Exam  Patient is alert and oriented x3 and in a relaxed and appropriate mood. Her gait is steady and hand grasps are equal. Sclera is clear. The breasts are nearly symmetrical. Left breast 11:00 4 cm from the nipple pea sized nodule.  The right breast tissue is soft without palpable abnormalities, discrete nodules or masses. The skin and nipples appear normal. There is no cervical, supraclavicular or axillary lymphadenopathy. Heart rate and rhythm normal, S1 and S2 appreciated. The lungs are clear to auscultation bilaterally. Abdomen is soft and non-tender.      Relevant Results and Imaging  BI mammo bilateral diagnostic tomosynthesis 05/08/2025  BI US breast limited bilateral 05/08/2025    Addendum 5/9/2025  9:11 AM  Interpreted By:  Gisell Granda,  ADDENDUM:  Upon further consideration, the imaging findings of the palpable mass  within the left breast at 11:00 , 4 cm from the nipple does not have  the classic appearance of hematoma.    Consultation in breast Clinic followed by ultrasound-guided left  breast biopsy is recommended for definitive diagnosis. The patient  was contacted by telephone (by Celeste Miller) and informed of the  recommendation for biopsy. A pre-procedure form was completed. The  patient was scheduled for pre biopsy consultation in breast Clinic  and ultrasound-guided biopsy of the left breast mass.    The recommendation stands for six-month follow-up of the right breast  mass.    Method of Detection: Category Pat - Patient Reported Self-examination  Finding      BI-RADS Category:  4 Suspicious.  Recommendation:  Surgical Consultation and Biopsy.  Recommended Date:  Immediate.  Laterality:  Left.    Critical Finding:  See findings. Notification was initiated on  5/9/2025 at 9:08 am by  Gisell Granda.  (**-YCF-**) Instructions:      Signed by: Gisell Granda 5/9/2025 9:11 AM    -------- ORIGINAL REPORT --------  Dictation workstation:   [id number]    Narrative  Interpreted By:  Gisell Granda,  STUDY:  BI MAMMO BILATERAL DIAGNOSTIC TOMOSYNTHESIS; BI US BREAST LIMITED  BILATERAL;  5/8/2025 3:28 pm; 5/8/2025 3:44 pm    ACCESSION NUMBER(S):  [id number]; [id number]    ORDERING CLINICIAN:  SARINA MURRAY    INDICATION:  Baseline  study. Bilateral palpable lumps, deep superior right breast  for 1-2 years and medial central left breast for approximately 2  weeks.    ,N63.0 Unspecified lump in unspecified breast    COMPARISON:  None.    FINDINGS:  MAMMOGRAPHY: 2D and tomosynthesis images were reviewed at 1 mm slice  thickness.    Density:  The breasts are heterogeneously dense, which may obscure  small masses.    At the site of palpable concern, deep superior right breast, there is  no mammographic abnormality. This is only included in the  mammographic field of view in the MLO projection. Incidentally noted  is an oval circumscribed equal density mass in the medial central  right breast at mid depth. At the site of palpable concern, medial  central left breast, there is an oval circumscribed equal density  mass. No suspicious distortion or calcifications are identified  within either breast.    ULTRASOUND: Targeted ultrasound was performed of the bilateral breast  masses in the area of palpable concern in the right axilla by a  registered sonographer with elastography.    Sonographic scanning targeted to the 12 o'clock position right breast  19 cm from the nipple, the site of palpable concern, normal  fibrofatty tissue is identified.    Sonographic scanning of the medial right breast confirms an oval  circumscribed parallel homogeneously hypoechoic mass at 1:00  position, 8 cm from the nipple measuring 0.9 x 0.5 x 0.8 cm. There is  no internal Doppler blood flow and the mass is soft on elastography.    Sonographic scanning of the medial central left breast demonstrates a  mixed echogenicity mass at 11:00 position 4 cm from the nipple. This  is comprised of a hyperechoic periphery with an anechoic center  measuring 1.1 x 0.7 x 0.9 cm. This is superficially located. There is  no internal Doppler blood flow. The mass is soft on elastography.    Impression  Probably benign left breast mass at the site of palpable concern, 11  o'clock position 4 cm  from the nipple. The margins are smooth on  mammogram. On ultrasound the findings are consistent with but not  specific for a bruise. Repeat ultrasound in 2 -3 months recommended  to document stability or improvement.    Probably benign mass, medial central right breast at the 1 o'clock  position, an incidental finding. Repeat right breast ultrasound in 6  months recommended to document stability.    No sonographic abnormality at the site of palpable concern, deep  superior right breast.    BI-RADS CATEGORY:  BI-RADS Category:  3 Probably Benign.  Recommendation:  Short-term Interval Follow-up Imaging.  Recommended Date:  2 Months.  Laterality:  Bilateral.    For any future breast imaging appointments, please call [phone] ([phone]).      MACRO:  None    Signed by: Gisell Granda 5/8/2025 4:05 PM  Dictation workstation:   [id number]      I explained the results in depth, along with suggested explanation for follow up recommendations based on the testing results. BI-RADS Category 4    Visit Diagnosis  1. Abnormal finding on breast imaging  BI US breast limited right    Clinic Appointment Request Follow Up            Assessment/Plan      Plan:  Proceed to biopsy. A breast radiology physician will perform the biopsy. Results are usually available in about 7-10 business days. I will call patient with results and instruct on next steps and plan.        Patient Discussion/Summary   I recommend a left breast biopsy. A breast radiology physician will perform the biopsy. Possible diagnoses include benign, atypical, or cancer as we discussed.  Bruising and mild discomfort after the biopsy is normal and will improve. I normally have results in 7-10 business days. I will call you with results, please have your phone handy to take my call.    IMPORTANT INFORMATION REGARDING YOUR RESULTS    If you receive medical information from My [facility] Personal Health Record (online chart) your results will be released into your chart. This  means you may view or see results of your biopsy or procedure before I contact you directly. If this occurs, please call the office and we will discuss your results over the phone.    You can see your health information, review clinical summaries from office visits & test results online when you follow your health with MY  Chart, a personal health record. To sign up go to [email or web address]. If you need assistance with signing up or trouble getting into your account call [employer] Patient Line 24/7 at [phone].    My office phone number is [phone] if you need to get in touch with me or have additional questions or concerns. Thank you for choosing [facility] and trusting me as your healthcare provider. I look forward to seeing you again at your next office visit. I am honored to be a provider on your health care team and I remain dedicated to helping you achieve your health goals.Proceed to biopsy. A breast radiology physician will perform the biopsy. Results are usually available in about 7-10 business days. I will call patient with results and instruct on next steps and plan.       Cindy Meza, APRN-CNP          [1] No past medical history on file.

## 2025-05-16 ENCOUNTER — HOSPITAL ENCOUNTER (OUTPATIENT)
Dept: RADIOLOGY | Facility: CLINIC | Age: 40
Discharge: HOME | End: 2025-05-16
Payer: COMMERCIAL

## 2025-05-16 DIAGNOSIS — N63.20 UNSPECIFIED LUMP IN THE LEFT BREAST, UNSPECIFIED QUADRANT: ICD-10-CM

## 2025-05-16 DIAGNOSIS — N63.20 LEFT BREAST MASS: ICD-10-CM

## 2025-05-16 PROCEDURE — 2500000004 HC RX 250 GENERAL PHARMACY W/ HCPCS (ALT 636 FOR OP/ED): Mod: JZ | Performed by: RADIOLOGY

## 2025-05-16 PROCEDURE — 19083 BX BREAST 1ST LESION US IMAG: CPT | Mod: LT

## 2025-05-16 PROCEDURE — C1819 TISSUE LOCALIZATION-EXCISION: HCPCS

## 2025-05-16 PROCEDURE — 2780000003 HC OR 278 NO HCPCS

## 2025-05-16 PROCEDURE — 2720000007 HC OR 272 NO HCPCS

## 2025-05-16 PROCEDURE — 76642 ULTRASOUND BREAST LIMITED: CPT | Mod: LT,RSC

## 2025-05-16 PROCEDURE — C1739 HC OR 272 NO HCPCS: HCPCS

## 2025-05-16 RX ADMIN — Medication 10 ML: at 14:00

## 2025-05-16 ASSESSMENT — PAIN SCALES - GENERAL
PAINLEVEL_OUTOF10: 0 - NO PAIN

## 2025-05-16 ASSESSMENT — PAIN - FUNCTIONAL ASSESSMENT: PAIN_FUNCTIONAL_ASSESSMENT: 0-10

## 2025-05-16 NOTE — PATIENT INSTRUCTIONS
I recommend a left breast biopsy. A breast radiology physician will perform the biopsy. Possible diagnoses include benign, atypical, or cancer as we discussed.  Bruising and mild discomfort after the biopsy is normal and will improve. I normally have results in 7-10 business days. I will call you with results, please have your phone handy to take my call.    IMPORTANT INFORMATION REGARDING YOUR RESULTS    If you receive medical information from My [facility] Personal Health Record (online chart) your results will be released into your chart. This means you may view or see results of your biopsy or procedure before I contact you directly. If this occurs, please call the office and we will discuss your results over the phone.    You can see your health information, review clinical summaries from office visits & test results online when you follow your health with MY  Chart, a personal health record. To sign up go to [email or web address]. If you need assistance with signing up or trouble getting into your account call [email or web address] Patient Line 24/7 at [phone].    My office phone number is [phone] if you need to get in touch with me or have additional questions or concerns. Thank you for choosing [facility] and trusting me as your healthcare provider. I look forward to seeing you again at your next office visit. I am honored to be a provider on your health care team and I remain dedicated to helping you achieve your health goals.Proceed to biopsy. A breast radiology physician will perform the biopsy. Results are usually available in about 7-10 business days. I will call patient with results and instruct on next steps and plan.

## 2025-05-16 NOTE — DISCHARGE INSTRUCTIONS
1335:Procedural steps explained and patient given opportunity to verbalize concerns and seek clarification.  Post procedure self-care and potential for bruising , hematoma, and pain reviewed.  Patient verbalizes understanding.      1335:Patient offered aromatherapy, warm blankets and music.   Guided imagery, touch and relaxation breathing to be used throughout the procedure.       1430:AFTER THE TEST  A steri-strip and bandage will be placed over the incision. You may shower after 24 hours. Remove bandage after 24 hours. Remove bandage after the shower. Leave the steri-strips in place to fall off on their own. If after 1 week the steri-strips are still on, you may remove them. Avoid swimming or soaking in tub for 3 days.     You may have mild discomfort at the test site. If needed, you may take Tylenol (Acetaminophen) for pain. Please avoid taking NSAIDs, Motrin, Advil, Aleve, or ibuprofen for 24 hours following the biopsy. After 24 hours you may resume NSAIDSs.     If you take aspirin, Plavix, Coumadin, Xarelto or Eliquis please tell us. If these medications were stopped by your provider, please ask them when to resume.     You may have some tenderness, bruising or slight bleeding at the site. Please apply ice packs to the site for 15 minutes on and 15 minutes off for a 2 hour minimum.     Most people can return to their usual routine after the procedure. Avoid Strenuous activity for 24 hours.     Sleep in a bra the night after your biopsy. Continue to do so for comfort.     Call your provider if you have any of the following symptoms :  Fever  Increased pain  Increased bleeding  Redness  Increased swelling  Yellowish drainage  Your provider will get the biopsy results within 5 - 7 days. Call your provider with any questions.     Patient education brochure and pain/comfort measures have been reviewed.   Phone number provided to contact Breast Center if problems arise.     Patient verbalized understanding of home  going instructions.    1436:  Patient discharged ambulatory.

## 2025-05-19 ENCOUNTER — TELEPHONE (OUTPATIENT)
Dept: RADIOLOGY | Facility: CLINIC | Age: 40
End: 2025-05-19

## 2025-05-19 LAB
LABORATORY COMMENT REPORT: NORMAL
PATH REPORT.FINAL DX SPEC: NORMAL
PATH REPORT.GROSS SPEC: NORMAL
PATH REPORT.RELEVANT HX SPEC: NORMAL
PATH REPORT.TOTAL CANCER: NORMAL

## 2025-05-20 ENCOUNTER — TELEPHONE (OUTPATIENT)
Dept: SURGICAL ONCOLOGY | Facility: CLINIC | Age: 40
End: 2025-05-20
Payer: COMMERCIAL

## 2025-05-20 NOTE — TELEPHONE ENCOUNTER
"Result Communication    Resulted Orders   Surgical Pathology Exam   Result Value Ref Range    Case Report       Surgical Pathology                                Case: [id number]                                  Authorizing Provider:  KEL Storm   Collected:           05/16/2025 1309              Ordering Location:     [facility]       Received:            05/16/2025 1511                                     Center                                                                       Pathologist:           Eliecer Bridges MD                                                           Specimen:    BREAST CORE BIOPSY LEFT, Left Breast 11:00 4CMFN                                           FINAL DIAGNOSIS       A.  Left breast, 11:00, 4 cm from the nipple, core biopsy:  --Breast tissue with nodular adenosis              By the signature on this report, the individual or group listed as making the Final Interpretation/Diagnosis certifies that they have reviewed this case.       Clinical History       PALPABLE      Gross Description       A: Received in formalin, labeled with the patient´s name and hospital number and \"left breast, 11:00, 4 cm FN\", are multiple irregular/cylindrical segments of yellow-white fatty soft tissue aggregating to 2.0 x 0.3 x 0.2 cm.  The specimen is submitted in toto in 1 cassette.   SHILO    NOTE:  Ischemia time: 5/16/2025, 01:09 PM.  This specimen was placed into formalin at: Not given.         12:52 PM      Results were successfully communicated with the patient and they acknowledged their understanding.     "

## 2025-06-17 ENCOUNTER — APPOINTMENT (OUTPATIENT)
Dept: OBSTETRICS AND GYNECOLOGY | Facility: CLINIC | Age: 40
End: 2025-06-17
Payer: COMMERCIAL

## 2025-06-17 VITALS — WEIGHT: 141 LBS | SYSTOLIC BLOOD PRESSURE: 116 MMHG | DIASTOLIC BLOOD PRESSURE: 78 MMHG | BODY MASS INDEX: 24.98 KG/M2

## 2025-06-17 DIAGNOSIS — Z01.419 ENCOUNTER FOR ANNUAL ROUTINE GYNECOLOGICAL EXAMINATION: Primary | ICD-10-CM

## 2025-06-17 PROCEDURE — 99396 PREV VISIT EST AGE 40-64: CPT | Performed by: OBSTETRICS & GYNECOLOGY

## 2025-06-17 PROCEDURE — 87626 HPV SEP HI-RSK TYP&POOL RSLT: CPT

## 2025-06-17 PROCEDURE — 88175 CYTOPATH C/V AUTO FLUID REDO: CPT

## 2025-06-17 SDOH — ECONOMIC STABILITY: FOOD INSECURITY: WITHIN THE PAST 12 MONTHS, YOU WORRIED THAT YOUR FOOD WOULD RUN OUT BEFORE YOU GOT MONEY TO BUY MORE.: NEVER TRUE

## 2025-06-17 SDOH — ECONOMIC STABILITY: TRANSPORTATION INSECURITY
IN THE PAST 12 MONTHS, HAS THE LACK OF TRANSPORTATION KEPT YOU FROM MEDICAL APPOINTMENTS OR FROM GETTING MEDICATIONS?: NO

## 2025-06-17 SDOH — ECONOMIC STABILITY: FOOD INSECURITY: WITHIN THE PAST 12 MONTHS, THE FOOD YOU BOUGHT JUST DIDN'T LAST AND YOU DIDN'T HAVE MONEY TO GET MORE.: NEVER TRUE

## 2025-06-17 SDOH — ECONOMIC STABILITY: TRANSPORTATION INSECURITY
IN THE PAST 12 MONTHS, HAS LACK OF TRANSPORTATION KEPT YOU FROM MEETINGS, WORK, OR FROM GETTING THINGS NEEDED FOR DAILY LIVING?: NO

## 2025-06-17 ASSESSMENT — PAIN SCALES - GENERAL: PAINLEVEL_OUTOF10: 0-NO PAIN

## 2025-06-17 NOTE — PROGRESS NOTES
Assessment and Plan:  Cate Krause is a 41 y/o G0 woman presenting today for annual GYN exam.     Diagnoses:  #1 Routine annual gynecologic exam    Assessment/Plan:  1. Annual GYN exam  - Pap smear  - Mammogram follow up per breast center.  - Follow up with PCP and other healthcare specialists PRN.  - Patient is not currently SA.    Follow up with Dr. Hill.    Nilsibe Attestation  By signing my name below, I, Kim Hdz, attest that this documentation has been prepared under the direction and in the presence of Scarlett Hill MD on 6/17/2025 at 5:10 PM.     HPI:   Cate Krause is a 41 y/o G0 woman presenting today for annual GYN exam. She recently underwent a benign breast biopsy in May 2025 after she appreciated a mass on home exam. She does not report any gynecologic issues today.    ROS:  Review of Systems    Physical Exam:   Physical Exam  Constitutional:       General: She is not in acute distress.     Appearance: Normal appearance. She is normal weight.   Genitourinary:      Vulva exam comments: Normal appearing external female genitalia, normal Bartholin's and [place]'s glands bilaterally  .      Vaginal exam comments: Moist, rugated, well estrogenized, well supported.  .        Right Adnexa: not tender and no mass present.     Left Adnexa: not tender and no mass present.     Cervical friability present.      No cervical motion tenderness.      Cervical exam comments: There is an ectropion..      Uterus is not enlarged, fixed or tender.      No uterine mass detected.  Breasts:     Breast exam comments: Deferred as she was just seen by the breast center in May and had a biopsy performed. She noted that it is healing okay..  HENT:      Head: Normocephalic and atraumatic.      Right Ear: External ear normal.      Left Ear: External ear normal.      Nose: Nose normal.      Mouth/Throat:      Mouth: Mucous membranes are moist.      Pharynx: Oropharynx is clear.   Eyes:      Extraocular Movements:  Extraocular movements intact.      Conjunctiva/sclera: Conjunctivae normal.      Pupils: Pupils are equal, round, and reactive to light.   Neck:      Thyroid: No thyromegaly.   Cardiovascular:      Rate and Rhythm: Normal rate and regular rhythm.      Pulses: Normal pulses.      Heart sounds: Normal heart sounds.   Pulmonary:      Effort: Pulmonary effort is normal.      Breath sounds: Normal breath sounds and air entry.   Abdominal:      Palpations: Abdomen is soft.      Tenderness: There is no abdominal tenderness.   Musculoskeletal:      Cervical back: Neck supple.   Neurological:      Mental Status: She is alert and oriented to person, place, and time.      Comments: Pleasant and cooperative

## 2025-06-17 NOTE — PATIENT INSTRUCTIONS
Thanks for coming in today for your annual GYN exam.    A Pap smear was sent.  Results should be available in the next few weeks.    Arrange to have a mammogram performed once a year.  However follow-up with the recommendations of the Breast Center after your recent breast biopsy.    Follow-up with your PCP and other healthcare specialist as needed.    Feel free to call the office with any problems, questions or concerns prior to your next scheduled visit.

## 2025-06-21 ENCOUNTER — APPOINTMENT (OUTPATIENT)
Dept: SURGICAL ONCOLOGY | Facility: CLINIC | Age: 40
End: 2025-06-21
Payer: COMMERCIAL

## 2025-06-30 ENCOUNTER — OFFICE VISIT (OUTPATIENT)
Dept: URGENT CARE | Age: 40
End: 2025-06-30
Payer: COMMERCIAL

## 2025-06-30 VITALS
RESPIRATION RATE: 18 BRPM | DIASTOLIC BLOOD PRESSURE: 78 MMHG | BODY MASS INDEX: 24.98 KG/M2 | HEIGHT: 63 IN | TEMPERATURE: 98.2 F | SYSTOLIC BLOOD PRESSURE: 125 MMHG | HEART RATE: 103 BPM | WEIGHT: 141 LBS | OXYGEN SATURATION: 100 %

## 2025-06-30 DIAGNOSIS — R10.11 RIGHT UPPER QUADRANT ABDOMINAL PAIN: Primary | ICD-10-CM

## 2025-06-30 LAB
CYTOLOGY CMNT CVX/VAG CYTO-IMP: NORMAL
HPV HR 12 DNA GENITAL QL NAA+PROBE: NEGATIVE
HPV HR GENOTYPES PNL CVX NAA+PROBE: NEGATIVE
HPV16 DNA SPEC QL NAA+PROBE: NEGATIVE
HPV18 DNA SPEC QL NAA+PROBE: NEGATIVE
LAB AP HPV GENOTYPE QUESTION: YES
LAB AP HPV HR: NORMAL
LABORATORY COMMENT REPORT: NORMAL
PATH REPORT.TOTAL CANCER: NORMAL

## 2025-06-30 ASSESSMENT — ENCOUNTER SYMPTOMS
VOMITING: 0
CONSTITUTIONAL NEGATIVE: 1
BLOOD IN STOOL: 0
NAUSEA: 1
ABDOMINAL PAIN: 1
DIARRHEA: 0

## 2025-06-30 NOTE — PATIENT INSTRUCTIONS
Drink plenty of water .   Take 2 table spoon full of a liquid Antacid such as Maalox or a similar product after meals.  If you developed severe pain go to ED.

## 2025-06-30 NOTE — PROGRESS NOTES
"Subjective   Patient ID: Cate Krause is a 40 y.o. female. They present today with a chief complaint of Gastritis.    History of Present Illness  HPI    Past Medical History  Allergies as of 06/30/2025    (No Known Allergies)       Prescriptions Prior to Admission[1]     Medical History[2]    Surgical History[3]     reports that she has never smoked. She has never been exposed to tobacco smoke. She has never used smokeless tobacco. She reports that she does not drink alcohol and does not use drugs.    Review of Systems  Review of Systems   Constitutional: Negative.    Gastrointestinal:  Positive for abdominal pain and nausea. Negative for blood in stool, diarrhea and vomiting.                                  Objective    Vitals:    06/30/25 1548   BP: 125/78   BP Location: Left arm   Patient Position: Sitting   BP Cuff Size: Adult   Pulse: 103   Resp: 18   Temp: 36.8 °C (98.2 °F)   TempSrc: Oral   SpO2: 100%   Weight: 64 kg (141 lb)   Height: 1.6 m (5' 3\")     No LMP recorded. (Menstrual status: OCP).    Physical Exam  Constitutional:       Appearance: Normal appearance.   Abdominal:      General: There is no distension.      Tenderness: There is no abdominal tenderness. There is no right CVA tenderness, left CVA tenderness or guarding.   Neurological:      Mental Status: She is alert.         Procedures    Point of Care Test & Imaging Results from this visit  No results found for this visit on 06/30/25.   Imaging  No results found.    Cardiology, Vascular, and Other Imaging  No other imaging results found for the past 2 days      Diagnostic study results (if any) were reviewed by Leola Flores MD.    Assessment/Plan   Allergies, medications, history, and pertinent labs/EKGs/Imaging reviewed by Leola Flores MD.     Medical Decision Making   Patient with Epigastric and RUQ pain, needs more work ups to rule out Cholecystitis Vs. Gastritis. I recommend taking Liquid Antacid after meals.    Orders and " Diagnoses  There are no diagnoses linked to this encounter.    Medical Admin Record      Patient disposition: Home    Electronically signed by Leola Flores MD  4:00 PM           [1] (Not in a hospital admission)  [2] No past medical history on file.  [3]   Past Surgical History:  Procedure Laterality Date    CERVICAL BIOPSY  W/ LOOP ELECTRODE EXCISION  05/29/2014    Cervical Loop Electrosurgical Excision (LEEP)    TEAR DUCT SURGERY N/A

## 2025-07-08 ENCOUNTER — APPOINTMENT (OUTPATIENT)
Dept: GASTROENTEROLOGY | Facility: CLINIC | Age: 40
End: 2025-07-08
Payer: COMMERCIAL

## 2025-07-08 VITALS
HEART RATE: 116 BPM | SYSTOLIC BLOOD PRESSURE: 109 MMHG | DIASTOLIC BLOOD PRESSURE: 76 MMHG | HEIGHT: 63 IN | BODY MASS INDEX: 24.2 KG/M2 | WEIGHT: 136.6 LBS

## 2025-07-08 DIAGNOSIS — K59.00 CONSTIPATION, UNSPECIFIED CONSTIPATION TYPE: ICD-10-CM

## 2025-07-08 DIAGNOSIS — R10.11 RUQ ABDOMINAL PAIN: Primary | ICD-10-CM

## 2025-07-08 PROCEDURE — 3008F BODY MASS INDEX DOCD: CPT | Performed by: NURSE PRACTITIONER

## 2025-07-08 PROCEDURE — 99204 OFFICE O/P NEW MOD 45 MIN: CPT | Performed by: NURSE PRACTITIONER

## 2025-07-08 PROCEDURE — 1036F TOBACCO NON-USER: CPT | Performed by: NURSE PRACTITIONER

## 2025-07-08 NOTE — ASSESSMENT & PLAN NOTE
Gas and bloating can be related to constipation.  Will obtain abdominal x-ray.  Take Miralax  Daily , can take up to 3 times daily as needed.  Take Metamucil Daily.   Follow diet and lifestyle as suggested

## 2025-07-08 NOTE — PROGRESS NOTES
CC: Right upper quadrant abdominal pain     History of Present Illness:   Cate Krause is a 40 y.o. female with a PMH of constipation, IBS.   Seen by PCP for RUQ pain  on 6/30/25.    Today here for abdominal pain, reports mostly RUQ pain, since  2 months.  Goes towards back side   intermittent, she reports feels  like gas pain, bloating.  associated with nausea  Greasy food makes it worse.  Pain resolves by itself.   Denies vomiting, reports occasional heartburn, acid reflux, Denies  Dysphagia.   Reports constipation, takes Miralx,   Denies diarrhea, fever.  BM every 2-3 days,  soft /solid/brown.   Denies hematochezia, melena.    Denies weight loss or appetite change.    Denies use of NSAIDs, aspirin or other blood thinners.  Denies any recent change in medication or use of new medication.    Father had colostomy, does not know why. Denies family history of colon cancer, celiac disease, inflammatory bowel disease, grandmother had gallbladder issue,.  Denies alcohol use, denies smoking, denies illicit drug use.    H/o Abdominal surgery -Denies     Review of Systems  ROS Negative unless otherwise stated above.      Past Medical/Surgical History  Medical History[1]   Surgical History[2]     Social History   reports that she has never smoked. She has never been exposed to tobacco smoke. She has never used smokeless tobacco. She reports that she does not drink alcohol and does not use drugs.     Family History  family history includes Diabetes in an other family member; Ovarian cancer in her paternal great-grandmother.     Allergies  RX Allergies[3]    Medications  Current Outpatient Medications   Medication Instructions    ciclopirox 1 % shampoo MASSAGE ONTO WET SCALP AND LEAVE ON FOR 3 MINUTES THEN RINSE. USE TWICE WEEKLY FOR 4 WEEKS    drospirenone-ethinyl estradioL (Vanita, 28,) 3-0.03 mg tablet 1 tablet once daily in the order presented in the blister pack, omitting placebo tablets, and continuing with a new pack  of active tablets    fluticasone (Flonase) 50 mcg/actuation nasal spray 1 spray, 2 times daily    medroxyPROGESTERone (DEPO-PROVERA) 150 mg, intramuscular, Every 3 months    valACYclovir (Valtrex) 500 mg tablet Take 1 tab PO daily        Objective   Visit Vitals  /76 (BP Location: Left arm, Patient Position: Sitting)   Pulse (!) 116      Physical Exam   General: A&Ox3, NAD.  HEENT: No scleral icterus, no conjunctival pallor, normocephalic, atraumatic  Neck:  atraumatic, trachea midline, no JVD   CV: RRR. Positive S1/S2.   Resp: CTA bilaterally. No wheezing, rhonchi or rales.   GI: BSx4. no distension, Soft, non-tender to palpation, no rebound tenderness, no guarding, no rigidity, no discernible ascites   Extremities: no lower extremity edema.  Neuro: No focal deficits. no asterixis  Psych: pleasant and cooperative.  Skin:  Warm and dry, no jaundice      Lab Results   Component Value Date    WBC 9.9 07/09/2024    HGB 12.5 07/09/2024    HCT 38.9 07/09/2024     07/09/2024     07/09/2024       Chemistry    Lab Results   Component Value Date/Time     07/09/2024 1600    K 4.1 07/09/2024 1600     07/09/2024 1600    CO2 24 07/09/2024 1600    BUN 10 07/09/2024 1600    CREATININE 0.66 07/09/2024 1600    Lab Results   Component Value Date/Time    CALCIUM 9.5 07/09/2024 1600    ALKPHOS 60 07/09/2024 1600    AST 13 07/09/2024 1600    ALT 10 07/09/2024 1600    BILITOT 0.3 07/09/2024 1600          Assessment & Plan  RUQ abdominal pain  Since 2 months, no epigastric tenderness or no rebound tenderness.  Will obtain abdominal ultrasound    Constipation, unspecified constipation type  Gas and bloating can be related to constipation.  Will obtain abdominal x-ray.  Take Miralax  Daily , can take up to 3 times daily as needed.  Take Metamucil Daily.   Follow diet and lifestyle as suggested    Follow-up in 2 months, will consider EGD and further lab work with no improvements in symptoms.        Rissa PIPER  SCOT Andrew-CNP    The note was created using voice recognition transcription software. Despite proofreading, unintentional typographical errors may be present. Please contact the GI office with any questions or concerns.          [1] History reviewed. No pertinent past medical history.  [2]   Past Surgical History:  Procedure Laterality Date    CERVICAL BIOPSY  W/ LOOP ELECTRODE EXCISION  05/29/2014    Cervical Loop Electrosurgical Excision (LEEP)    TEAR DUCT SURGERY N/A    [3] No Known Allergies

## 2025-07-08 NOTE — PATIENT INSTRUCTIONS
-Take Miralax  Daily   -Take Metamucil Daily.   -Increase water intake and physical activity as tolerated   -Increase fiber intake (fruits such as strawberries, raspberries, and apples - vegetables such as broccoli, lima beans, and carrots - whole-grain breads such as seven-grain, cracked wheat, or pumpernickel). Add fiber foods slowly not all at once!  -Change your bathroom posture:  Try Toilet footstools or Squatty Potty  -Try to have Bowel movement at the same time every day (such as in the morning at home, after you eat breakfast). Try to not hold it in or put off a bowel movement.     Avoid fatty oily and greasy food.    Complete ultrasound and abdominal x-ray as ordered.    Will follow-up in 2 months

## 2025-07-09 ENCOUNTER — TELEPHONE (OUTPATIENT)
Dept: GASTROENTEROLOGY | Facility: CLINIC | Age: 40
End: 2025-07-09
Payer: COMMERCIAL

## 2025-07-09 NOTE — TELEPHONE ENCOUNTER
Patient called to ask for an X ray order to be placed. Patient was able to schedule an ultrasound, and is asking how to prepare for it. Please review. Thank you.

## 2025-07-11 ENCOUNTER — HOSPITAL ENCOUNTER (OUTPATIENT)
Dept: RADIOLOGY | Facility: CLINIC | Age: 40
Discharge: HOME | End: 2025-07-11
Payer: COMMERCIAL

## 2025-07-11 DIAGNOSIS — R10.11 RUQ ABDOMINAL PAIN: ICD-10-CM

## 2025-07-11 PROCEDURE — 76700 US EXAM ABDOM COMPLETE: CPT | Performed by: RADIOLOGY

## 2025-07-11 PROCEDURE — 76700 US EXAM ABDOM COMPLETE: CPT

## 2025-07-14 ENCOUNTER — TELEPHONE (OUTPATIENT)
Dept: GASTROENTEROLOGY | Facility: CLINIC | Age: 40
End: 2025-07-14
Payer: COMMERCIAL

## 2025-07-14 DIAGNOSIS — R10.11 RUQ ABDOMINAL PAIN: Primary | ICD-10-CM

## 2025-07-14 DIAGNOSIS — K80.20 CALCULUS OF GALLBLADDER WITHOUT CHOLECYSTITIS WITHOUT OBSTRUCTION: ICD-10-CM

## 2025-07-14 NOTE — TELEPHONE ENCOUNTER
Patient called to ask if she could discuss her ultrasound results with KEO Kwok. Patient didn't want to schedule an office visit, would rather get a phone call regarding this issue. Please review. Thank you.

## 2025-07-14 NOTE — TELEPHONE ENCOUNTER
Discussed the result of US with the patient, with continuous  RUQ pain and  GI symptoms   ,will refer to surgery.pt. understood the plan of care.

## 2025-07-31 ENCOUNTER — APPOINTMENT (OUTPATIENT)
Dept: SURGERY | Facility: CLINIC | Age: 40
End: 2025-07-31
Payer: COMMERCIAL

## 2025-07-31 VITALS
SYSTOLIC BLOOD PRESSURE: 105 MMHG | TEMPERATURE: 97.1 F | HEART RATE: 85 BPM | BODY MASS INDEX: 24.02 KG/M2 | DIASTOLIC BLOOD PRESSURE: 71 MMHG | WEIGHT: 135.6 LBS

## 2025-07-31 DIAGNOSIS — K80.20 GALLSTONES: Primary | ICD-10-CM

## 2025-07-31 PROCEDURE — 99243 OFF/OP CNSLTJ NEW/EST LOW 30: CPT | Performed by: SURGERY

## 2025-07-31 PROCEDURE — 1036F TOBACCO NON-USER: CPT | Performed by: SURGERY

## 2025-07-31 ASSESSMENT — PAIN SCALES - GENERAL: PAINLEVEL_OUTOF10: 0-NO PAIN

## 2025-07-31 NOTE — PROGRESS NOTES
History Of Present Illness  Cate Krause is a 40 y.o. female presenting recurring episodes of biliary colic.  She works at .  She works in medical records.  She said no previous abdominal surgery.  Ultrasound showed evidence of gallstones.  She had no history of jaundice or pancreatitis.        Last Recorded Vitals  Blood pressure 105/71, pulse 85, temperature 36.2 °C (97.1 °F), weight 61.5 kg (135 lb 9.6 oz).  Physical Examination  Alert normal respiration abdomen is benign.      Relevant Results  Reviewed the ultrasound results.    Assessment/Plan   Symptomatic cholelithiasis.  I reviewed the gallbladder booklet with her.  Will plan a laparoscopic cholecystectomy at her convenience.  Virgilio Lewis MD FACS  Professor of Surgery  Donna Jason [facility] in Surgical [place]  [facility] [facility] [employer]  [address] [place] [address], [id number]  Phone [phone]  email: [email or web address]

## 2025-07-31 NOTE — LETTER
July 31, 2025     SCOT Dao-CNP  [address] [address]    Patient: Cate Krause   YOB: 1985   Date of Visit: 7/31/2025       Dear SCOT Forrest-CNP:    Thank you for referring Cate Krause to me for evaluation. Below are my notes for this consultation.  If you have questions, please do not hesitate to call me. I look forward to following your patient along with you.       Sincerely,     Virgilio Lewis MD      CC: No Recipients  ______________________________________________________________________________________    History Of Present Illness  Cate Krause is a 40 y.o. female presenting recurring episodes of biliary colic.  She works at .  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  She said no previous abdominal surgery.  Ultrasound showed evidence of gallstones.  She had no history of jaundice or pancreatitis.        Last Recorded Vitals  Blood pressure 105/71, pulse 85, temperature 36.2 °C (97.1 °F), weight 61.5 kg (135 lb 9.6 oz).  Physical Examination  Alert normal respiration abdomen is benign.      Relevant Results  Reviewed the ultrasound results.    Assessment/Plan  Symptomatic cholelithiasis.  I reviewed the gallbladder booklet with her.  Will plan a laparoscopic cholecystectomy at her convenience.  Virgilio Lewis MD FACS  Professor of Surgery  Donna Jason Chair in Surgical [place]  [facility] [employer]  [address] [place] [address], [id number]  Phone [phone]  email: [email or web address]

## 2025-08-14 ENCOUNTER — ANESTHESIA EVENT (OUTPATIENT)
Dept: OPERATING ROOM | Facility: HOSPITAL | Age: 40
End: 2025-08-14
Payer: COMMERCIAL

## 2025-08-15 ENCOUNTER — ANESTHESIA (OUTPATIENT)
Dept: OPERATING ROOM | Facility: HOSPITAL | Age: 40
End: 2025-08-15
Payer: COMMERCIAL

## 2025-08-15 ENCOUNTER — HOSPITAL ENCOUNTER (OUTPATIENT)
Facility: HOSPITAL | Age: 40
Setting detail: OUTPATIENT SURGERY
Discharge: HOME | End: 2025-08-15
Attending: SURGERY | Admitting: SURGERY
Payer: COMMERCIAL

## 2025-08-15 VITALS
TEMPERATURE: 96.8 F | HEART RATE: 56 BPM | SYSTOLIC BLOOD PRESSURE: 101 MMHG | HEIGHT: 63 IN | OXYGEN SATURATION: 100 % | RESPIRATION RATE: 17 BRPM | DIASTOLIC BLOOD PRESSURE: 62 MMHG | WEIGHT: 130.82 LBS | BODY MASS INDEX: 23.18 KG/M2

## 2025-08-15 DIAGNOSIS — G89.18 ACUTE POSTOPERATIVE PAIN: ICD-10-CM

## 2025-08-15 DIAGNOSIS — K80.20 GALLSTONES: Primary | ICD-10-CM

## 2025-08-15 LAB — PREGNANCY TEST URINE, POC: NEGATIVE

## 2025-08-15 PROCEDURE — 47562 LAPAROSCOPIC CHOLECYSTECTOMY: CPT | Performed by: SURGERY

## 2025-08-15 PROCEDURE — 3700000002 HC GENERAL ANESTHESIA TIME - EACH INCREMENTAL 1 MINUTE: Performed by: SURGERY

## 2025-08-15 PROCEDURE — 2780000003 HC OR 278 NO HCPCS: Performed by: SURGERY

## 2025-08-15 PROCEDURE — 7100000010 HC PHASE TWO TIME - EACH INCREMENTAL 1 MINUTE: Performed by: SURGERY

## 2025-08-15 PROCEDURE — 2500000005 HC RX 250 GENERAL PHARMACY W/O HCPCS: Performed by: ANESTHESIOLOGY

## 2025-08-15 PROCEDURE — 3600000009 HC OR TIME - EACH INCREMENTAL 1 MINUTE - PROCEDURE LEVEL FOUR: Performed by: SURGERY

## 2025-08-15 PROCEDURE — 7100000009 HC PHASE TWO TIME - INITIAL BASE CHARGE: Performed by: SURGERY

## 2025-08-15 PROCEDURE — 2500000004 HC RX 250 GENERAL PHARMACY W/ HCPCS (ALT 636 FOR OP/ED): Performed by: ANESTHESIOLOGY

## 2025-08-15 PROCEDURE — 3600000004 HC OR TIME - INITIAL BASE CHARGE - PROCEDURE LEVEL FOUR: Performed by: SURGERY

## 2025-08-15 PROCEDURE — 81025 URINE PREGNANCY TEST: CPT | Performed by: SURGERY

## 2025-08-15 PROCEDURE — 2500000005 HC RX 250 GENERAL PHARMACY W/O HCPCS: Performed by: SURGERY

## 2025-08-15 PROCEDURE — 88304 TISSUE EXAM BY PATHOLOGIST: CPT | Mod: TC,AHULAB | Performed by: SURGERY

## 2025-08-15 PROCEDURE — 7100000001 HC RECOVERY ROOM TIME - INITIAL BASE CHARGE: Performed by: SURGERY

## 2025-08-15 PROCEDURE — 7100000002 HC RECOVERY ROOM TIME - EACH INCREMENTAL 1 MINUTE: Performed by: SURGERY

## 2025-08-15 PROCEDURE — 2500000004 HC RX 250 GENERAL PHARMACY W/ HCPCS (ALT 636 FOR OP/ED): Mod: JZ | Performed by: ANESTHESIOLOGY

## 2025-08-15 PROCEDURE — 88304 TISSUE EXAM BY PATHOLOGIST: CPT | Performed by: PATHOLOGY

## 2025-08-15 PROCEDURE — 2720000007 HC OR 272 NO HCPCS: Performed by: SURGERY

## 2025-08-15 PROCEDURE — 76998 US GUIDE INTRAOP: CPT | Performed by: SURGERY

## 2025-08-15 PROCEDURE — 3700000001 HC GENERAL ANESTHESIA TIME - INITIAL BASE CHARGE: Performed by: SURGERY

## 2025-08-15 RX ORDER — KETOROLAC TROMETHAMINE 30 MG/ML
INJECTION, SOLUTION INTRAMUSCULAR; INTRAVENOUS AS NEEDED
Status: DISCONTINUED | OUTPATIENT
Start: 2025-08-15 | End: 2025-08-15

## 2025-08-15 RX ORDER — OXYCODONE HYDROCHLORIDE 5 MG/1
5 TABLET ORAL EVERY 4 HOURS PRN
Status: DISCONTINUED | OUTPATIENT
Start: 2025-08-15 | End: 2025-08-15 | Stop reason: HOSPADM

## 2025-08-15 RX ORDER — DEXMEDETOMIDINE IN 0.9 % NACL 20 MCG/5ML
SYRINGE (ML) INTRAVENOUS AS NEEDED
Status: DISCONTINUED | OUTPATIENT
Start: 2025-08-15 | End: 2025-08-15

## 2025-08-15 RX ORDER — MIDAZOLAM HYDROCHLORIDE 2 MG/2ML
INJECTION, SOLUTION INTRAMUSCULAR; INTRAVENOUS AS NEEDED
Status: DISCONTINUED | OUTPATIENT
Start: 2025-08-15 | End: 2025-08-15

## 2025-08-15 RX ORDER — ESMOLOL HYDROCHLORIDE 10 MG/ML
INJECTION INTRAVENOUS AS NEEDED
Status: DISCONTINUED | OUTPATIENT
Start: 2025-08-15 | End: 2025-08-15

## 2025-08-15 RX ORDER — BUPIVACAINE HCL/EPINEPHRINE 0.5-1:200K
VIAL (ML) INJECTION AS NEEDED
Status: DISCONTINUED | OUTPATIENT
Start: 2025-08-15 | End: 2025-08-15 | Stop reason: HOSPADM

## 2025-08-15 RX ORDER — MEPERIDINE HYDROCHLORIDE 25 MG/ML
12.5 INJECTION INTRAMUSCULAR; INTRAVENOUS; SUBCUTANEOUS EVERY 10 MIN PRN
Status: DISCONTINUED | OUTPATIENT
Start: 2025-08-15 | End: 2025-08-15 | Stop reason: HOSPADM

## 2025-08-15 RX ORDER — CEFAZOLIN 1 G/1
INJECTION, POWDER, FOR SOLUTION INTRAVENOUS AS NEEDED
Status: DISCONTINUED | OUTPATIENT
Start: 2025-08-15 | End: 2025-08-15

## 2025-08-15 RX ORDER — LIDOCAINE HYDROCHLORIDE 10 MG/ML
0.1 INJECTION, SOLUTION EPIDURAL; INFILTRATION; INTRACAUDAL; PERINEURAL ONCE
Status: DISCONTINUED | OUTPATIENT
Start: 2025-08-15 | End: 2025-08-15 | Stop reason: HOSPADM

## 2025-08-15 RX ORDER — ONDANSETRON HYDROCHLORIDE 2 MG/ML
INJECTION, SOLUTION INTRAVENOUS AS NEEDED
Status: DISCONTINUED | OUTPATIENT
Start: 2025-08-15 | End: 2025-08-15

## 2025-08-15 RX ORDER — ROCURONIUM BROMIDE 10 MG/ML
INJECTION, SOLUTION INTRAVENOUS AS NEEDED
Status: DISCONTINUED | OUTPATIENT
Start: 2025-08-15 | End: 2025-08-15

## 2025-08-15 RX ORDER — PROPOFOL 10 MG/ML
INJECTION, EMULSION INTRAVENOUS AS NEEDED
Status: DISCONTINUED | OUTPATIENT
Start: 2025-08-15 | End: 2025-08-15

## 2025-08-15 RX ORDER — FENTANYL CITRATE 50 UG/ML
INJECTION, SOLUTION INTRAMUSCULAR; INTRAVENOUS AS NEEDED
Status: DISCONTINUED | OUTPATIENT
Start: 2025-08-15 | End: 2025-08-15

## 2025-08-15 RX ORDER — LIDOCAINE HYDROCHLORIDE 20 MG/ML
INJECTION, SOLUTION INFILTRATION; PERINEURAL AS NEEDED
Status: DISCONTINUED | OUTPATIENT
Start: 2025-08-15 | End: 2025-08-15

## 2025-08-15 RX ORDER — METOCLOPRAMIDE HYDROCHLORIDE 5 MG/ML
10 INJECTION INTRAMUSCULAR; INTRAVENOUS ONCE AS NEEDED
Status: DISCONTINUED | OUTPATIENT
Start: 2025-08-15 | End: 2025-08-15 | Stop reason: HOSPADM

## 2025-08-15 RX ORDER — SODIUM CHLORIDE, SODIUM LACTATE, POTASSIUM CHLORIDE, CALCIUM CHLORIDE 600; 310; 30; 20 MG/100ML; MG/100ML; MG/100ML; MG/100ML
100 INJECTION, SOLUTION INTRAVENOUS CONTINUOUS
Status: DISCONTINUED | OUTPATIENT
Start: 2025-08-15 | End: 2025-08-15 | Stop reason: HOSPADM

## 2025-08-15 RX ORDER — PHENYLEPHRINE HCL IN 0.9% NACL 0.4MG/10ML
SYRINGE (ML) INTRAVENOUS AS NEEDED
Status: DISCONTINUED | OUTPATIENT
Start: 2025-08-15 | End: 2025-08-15

## 2025-08-15 RX ORDER — SODIUM CHLORIDE 0.9 G/100ML
INJECTION, SOLUTION IRRIGATION AS NEEDED
Status: DISCONTINUED | OUTPATIENT
Start: 2025-08-15 | End: 2025-08-15 | Stop reason: HOSPADM

## 2025-08-15 RX ORDER — TRAMADOL HYDROCHLORIDE 50 MG/1
50 TABLET, FILM COATED ORAL EVERY 6 HOURS PRN
Qty: 12 TABLET | Refills: 0 | Status: SHIPPED | OUTPATIENT
Start: 2025-08-15 | End: 2025-08-22

## 2025-08-15 RX ORDER — ONDANSETRON HYDROCHLORIDE 2 MG/ML
4 INJECTION, SOLUTION INTRAVENOUS ONCE AS NEEDED
Status: DISCONTINUED | OUTPATIENT
Start: 2025-08-15 | End: 2025-08-15 | Stop reason: HOSPADM

## 2025-08-15 RX ADMIN — SODIUM CHLORIDE, SODIUM LACTATE, POTASSIUM CHLORIDE, AND CALCIUM CHLORIDE: .6; .31; .03; .02 INJECTION, SOLUTION INTRAVENOUS at 07:30

## 2025-08-15 RX ADMIN — Medication 150 MCG: at 07:49

## 2025-08-15 RX ADMIN — ONDANSETRON 4 MG: 2 INJECTION INTRAMUSCULAR; INTRAVENOUS at 07:42

## 2025-08-15 RX ADMIN — SUGAMMADEX 120 MG: 100 INJECTION, SOLUTION INTRAVENOUS at 08:09

## 2025-08-15 RX ADMIN — ESMOLOL HYDROCHLORIDE 50 MG: 10 INJECTION, SOLUTION INTRAVENOUS at 07:36

## 2025-08-15 RX ADMIN — ROCURONIUM BROMIDE 50 MG: 10 INJECTION, SOLUTION INTRAVENOUS at 07:32

## 2025-08-15 RX ADMIN — Medication 150 MCG: at 07:43

## 2025-08-15 RX ADMIN — FENTANYL CITRATE 100 MCG: 50 INJECTION, SOLUTION INTRAMUSCULAR; INTRAVENOUS at 07:32

## 2025-08-15 RX ADMIN — KETOROLAC TROMETHAMINE 30 MG: 30 INJECTION, SOLUTION INTRAMUSCULAR; INTRAVENOUS at 07:46

## 2025-08-15 RX ADMIN — MIDAZOLAM HYDROCHLORIDE 2 MG: 1 INJECTION, SOLUTION INTRAMUSCULAR; INTRAVENOUS at 07:30

## 2025-08-15 RX ADMIN — SUGAMMADEX 80 MG: 100 INJECTION, SOLUTION INTRAVENOUS at 08:10

## 2025-08-15 RX ADMIN — HYDROMORPHONE HYDROCHLORIDE 0.5 MG: 1 INJECTION, SOLUTION INTRAMUSCULAR; INTRAVENOUS; SUBCUTANEOUS at 08:34

## 2025-08-15 RX ADMIN — Medication 4 MCG: at 08:10

## 2025-08-15 RX ADMIN — PROPOFOL 160 MG: 10 INJECTION, EMULSION INTRAVENOUS at 07:32

## 2025-08-15 RX ADMIN — Medication 150 MCG: at 07:53

## 2025-08-15 RX ADMIN — Medication: at 08:18

## 2025-08-15 RX ADMIN — LIDOCAINE HYDROCHLORIDE 50 MG: 20 INJECTION, SOLUTION INFILTRATION; PERINEURAL at 07:32

## 2025-08-15 RX ADMIN — DEXAMETHASONE SODIUM PHOSPHATE 4 MG: 4 INJECTION, SOLUTION INTRA-ARTICULAR; INTRALESIONAL; INTRAMUSCULAR; INTRAVENOUS; SOFT TISSUE at 07:33

## 2025-08-15 RX ADMIN — Medication 8 MCG: at 07:56

## 2025-08-15 RX ADMIN — CEFAZOLIN 2 G: 330 INJECTION, POWDER, FOR SOLUTION INTRAMUSCULAR; INTRAVENOUS at 07:35

## 2025-08-15 ASSESSMENT — PAIN - FUNCTIONAL ASSESSMENT
PAIN_FUNCTIONAL_ASSESSMENT: 0-10
PAIN_FUNCTIONAL_ASSESSMENT: 0-10
PAIN_FUNCTIONAL_ASSESSMENT: UNABLE TO SELF-REPORT
PAIN_FUNCTIONAL_ASSESSMENT: 0-10

## 2025-08-15 ASSESSMENT — PAIN SCALES - GENERAL
PAINLEVEL_OUTOF10: 0 - NO PAIN
PAINLEVEL_OUTOF10: 7
PAIN_LEVEL: 0
PAINLEVEL_OUTOF10: 3
PAINLEVEL_OUTOF10: 7
PAINLEVEL_OUTOF10: 3

## 2025-08-15 ASSESSMENT — PAIN DESCRIPTION - LOCATION: LOCATION: ABDOMEN

## 2025-08-18 ASSESSMENT — PAIN SCALES - GENERAL: PAINLEVEL_OUTOF10: 2

## 2025-08-27 ENCOUNTER — APPOINTMENT (OUTPATIENT)
Dept: PRIMARY CARE | Facility: CLINIC | Age: 40
End: 2025-08-27
Payer: COMMERCIAL

## 2025-08-28 ENCOUNTER — APPOINTMENT (OUTPATIENT)
Dept: SURGERY | Facility: CLINIC | Age: 40
End: 2025-08-28
Payer: COMMERCIAL

## 2025-08-28 VITALS
SYSTOLIC BLOOD PRESSURE: 102 MMHG | BODY MASS INDEX: 23.79 KG/M2 | HEART RATE: 76 BPM | TEMPERATURE: 98 F | DIASTOLIC BLOOD PRESSURE: 63 MMHG | WEIGHT: 134.3 LBS

## 2025-08-28 DIAGNOSIS — K80.20 GALLSTONES: Primary | ICD-10-CM

## 2025-08-28 PROCEDURE — 99024 POSTOP FOLLOW-UP VISIT: CPT | Performed by: SURGERY

## 2025-08-28 PROCEDURE — 1036F TOBACCO NON-USER: CPT | Performed by: SURGERY

## 2025-08-28 ASSESSMENT — PATIENT HEALTH QUESTIONNAIRE - PHQ9
1. LITTLE INTEREST OR PLEASURE IN DOING THINGS: NOT AT ALL
SUM OF ALL RESPONSES TO PHQ9 QUESTIONS 1 & 2: 0
2. FEELING DOWN, DEPRESSED OR HOPELESS: NOT AT ALL

## 2025-08-28 ASSESSMENT — PAIN SCALES - GENERAL: PAINLEVEL_OUTOF10: 0-NO PAIN

## 2025-08-28 ASSESSMENT — ENCOUNTER SYMPTOMS
LOSS OF SENSATION IN FEET: 0
OCCASIONAL FEELINGS OF UNSTEADINESS: 0
DEPRESSION: 0

## 2025-09-02 ENCOUNTER — APPOINTMENT (OUTPATIENT)
Dept: SURGERY | Facility: CLINIC | Age: 40
End: 2025-09-02
Payer: COMMERCIAL

## 2026-06-23 ENCOUNTER — APPOINTMENT (OUTPATIENT)
Dept: OBSTETRICS AND GYNECOLOGY | Facility: CLINIC | Age: 41
End: 2026-06-23
Payer: COMMERCIAL

## (undated) DEVICE — GLOVE, SURGICAL, PROTEXIS PI W/NEU-THERA, 7.5, PF, LF

## (undated) DEVICE — TROCAR SYSTEM, BALLOON, KII GELPORT, 12 X 100MM

## (undated) DEVICE — SUTURE, VICRYL PLUS, 4-0, 27 IN, PS-2

## (undated) DEVICE — SHEAR, W/UNIPOLAR CAUTERY, ENDOSHEAR, 5 MM

## (undated) DEVICE — RETRIEVAL SYSTEM, MONARCH, 10MM DISP ENDOSCOPIC

## (undated) DEVICE — PUMP, STRYKERFLOW 2 & HANDPIECE W/10FT. IRRIGATION TUBING

## (undated) DEVICE — Device

## (undated) DEVICE — ENDO, PORT VERSASTEP 5MM

## (undated) DEVICE — APPLICATOR, CHLORAPREP, W/ORANGE TINT, 26ML

## (undated) DEVICE — STRIP, SKIN CLOSURE, STERI STRIP, REINFORCED, 0.5 X 4 IN

## (undated) DEVICE — NEEDLE, INSUFFLATION, 14 G, 100 MM

## (undated) DEVICE — COVER, TRANSDUCER, LAPROSCOPIC, FOLDED, STERILE, LF

## (undated) DEVICE — CORD, MONOPOLAR, HIGH FREQUENCY, W/8MM PLUG F/VALLEYLAB, 8FT/244CM, STRL

## (undated) DEVICE — GLOVE, SURGICAL, PROTEXIS PI MICRO, 7.0, PF, LF

## (undated) DEVICE — TUBE SET, PNEUMOCLEAR, SMOKE EVACU, HIGH-FLOW

## (undated) DEVICE — SCOPE WARMER, LAPAROSCOPE, BAG ONLY, LF

## (undated) DEVICE — CLIP, ENDO APPLIER LIGAMAX 5MM

## (undated) DEVICE — SUTURE, VICRYL, 0, 27 IN, UR-6, VIOLET